# Patient Record
Sex: FEMALE | Race: WHITE | Employment: UNEMPLOYED | URBAN - METROPOLITAN AREA
[De-identification: names, ages, dates, MRNs, and addresses within clinical notes are randomized per-mention and may not be internally consistent; named-entity substitution may affect disease eponyms.]

---

## 2017-08-17 ENCOUNTER — OFFICE VISIT (OUTPATIENT)
Dept: OBGYN CLINIC | Age: 42
End: 2017-08-17

## 2017-08-17 VITALS
HEIGHT: 66 IN | WEIGHT: 170.6 LBS | RESPIRATION RATE: 18 BRPM | SYSTOLIC BLOOD PRESSURE: 116 MMHG | BODY MASS INDEX: 27.42 KG/M2 | DIASTOLIC BLOOD PRESSURE: 68 MMHG

## 2017-08-17 DIAGNOSIS — Z01.411 ENCOUNTER FOR GYNECOLOGICAL EXAMINATION (GENERAL) (ROUTINE) WITH ABNORMAL FINDINGS: Primary | ICD-10-CM

## 2017-08-17 DIAGNOSIS — N92.6 IRREGULAR MENSES: ICD-10-CM

## 2017-08-17 DIAGNOSIS — N92.4 EXCESSIVE BLEEDING IN PREMENOPAUSAL PERIOD: ICD-10-CM

## 2017-08-17 RX ORDER — NORGESTIMATE AND ETHINYL ESTRADIOL 7DAYSX3 28
1 KIT ORAL DAILY
Qty: 3 DOSE PACK | Refills: 4 | Status: SHIPPED | OUTPATIENT
Start: 2017-08-17 | End: 2021-01-11

## 2017-08-17 RX ORDER — PAROXETINE 10 MG/1
TABLET, FILM COATED ORAL
Refills: 3 | COMMUNITY
Start: 2017-07-20

## 2017-08-17 RX ORDER — NORGESTIMATE AND ETHINYL ESTRADIOL 7DAYSX3 28
KIT ORAL
Refills: 2 | COMMUNITY
Start: 2017-07-16 | End: 2017-08-17 | Stop reason: SDUPTHER

## 2017-08-17 NOTE — PATIENT INSTRUCTIONS

## 2017-08-17 NOTE — PROGRESS NOTES
[facility]  [email or web address]  [phone]    Rosy Swain MD, [address]       Annual Gynecologic Exam  WWE 90-96  Chief Complaint   Patient presents with    Irregular Menses    Well Woman    Insect Bite       Khari Womack is a 38 y.o.  [facility]  female who presents for an annual exam.  Patient's last menstrual period was 2017 (exact date). .    She does report additional concerns today. Patient reports that she has had irregular vaginal bleeding for a few years since having her twins. She notices that she has spotting in between periods for 2 to 3 days. Expressed that her periods are usually very heavy the first three days. Her period lasts for 5 to 7 days. Would eventually like to stop taking birth control when her  has a vasectomy. Has mild cramping and bloating with her periods. She reports h/o bleeding and retained POC with SAB, but not with delivery. She also had bleeding issues with surgery when she was younger. Had spotting in middle of pack. Menstrual status:  Her periods are heavy, spotting, irregular cycles. She does not report dysmenorrhea/painful menses. She does report irregular bleeding. Sexual history and Contraception:  History   Sexual Activity    Sexual activity: Yes    Partners: Male    Birth control/ protection: Pill     She never uses condoms with sexual activity. She does not reports new sexual partner(s) in the last year. The patient does not request STD testing. Preventive Medicine History:  Her last annual GYN exam was about one year ago. Her most recent Pap smear result: normal was obtained in 2016, as reported by patient. Her most recent HR HPV screen was Negative obtained 1 year(s) ago. She does not have a history of LUCINDA 2, 3 or cervical cancer. Breast health:  Last mammogram:has never had a mammogram.  A mammogram was not scheduled for today.   Breast cancer family updated: see FH. Bone health: Apurva Nicholson She does not have a history of osteopenia/osteoporosis. Osteoporosis family history updated: see FH. Past Medical History:   Diagnosis Date    Thyroid activity decreased      OB History    Para Term  AB Living   4 3 2  1 4   SAB TAB Ectopic Molar Multiple Live Births   1    1 4      # Outcome Date GA Lbr Isaak/2nd Weight Sex Delivery Anes PTL Lv   4 Term 14 38w6d 02:50 / 00:16 8 lb 3.4 oz (3.725 kg) F VACD EPIDURAL AN N BREE   3A Term 07/23/10 38w0d   F  LO EPI N BREE   3B Term 07/23/10    M  LO EPI N BREE   2 SAB 2009           1 Para 07    F VAGINAL DELI EPI N BREE      Birth Comments: Induced for post dates          Past Surgical History:   Procedure Laterality Date     DELIVERY ONLY      HX OTHER SURGICAL      D&C x 3    HX OTHER SURGICAL      Tonsillectomy as child     Family History   Problem Relation Age of Onset    Cancer Father      prostate    High Cholesterol Father     Cancer Sister      leukemia as child, breast -brca carrier    Diabetes Sister     Cancer Mother      Skin    Cancer Paternal Grandfather      Skin    Stroke Paternal Grandfather      Social History     Social History    Marital status:      Spouse name: N/A    Number of children: N/A    Years of education: N/A     Occupational History    Not on file.      Social History Main Topics    Smoking status: Never Smoker    Smokeless tobacco: Never Used    Alcohol use No    Drug use: No    Sexual activity: Yes     Partners: Male     Birth control/ protection: Pill     Other Topics Concern    Not on file     Social History Narrative       Allergies   Allergen Reactions    Penicillins Rash       Current Outpatient Prescriptions   Medication Sig    PARoxetine (PAXIL) 10 mg tablet TAKE 1 TABLET BY MOUTH AT BEDTIME    norgestimate-ethinyl estradiol (TRI-SPRINTEC, 28,) 0.18/0.215/0.25 mg-35 mcg (28) tab Take 1 Tab by mouth daily.    oxyCODONE-acetaminophen (PERCOCET) 5-325 mg per tablet Take 1 Tab by mouth every four (4) hours as needed for Pain.  ibuprofen (MOTRIN) 600 mg tablet Take 1 Tab by mouth every eight (8) hours as needed for Pain.  levothyroxine (SYNTHROID) 112 mcg tablet Take 112 mcg by mouth Daily (before breakfast).  prenatal multivit-ca-min-fe-fa tab Take  by mouth. Indications: PREGNANCY    calcium carbonate (TUMS) 200 mg calcium (500 mg) chew Take 1 Tab by mouth as needed. Indications: HEARTBURN     No current facility-administered medications for this visit.         Patient Active Problem List   Diagnosis Code    Unspecified failed trial of labor, antepartum O66.40    H/O:  Z98.891    Labor abnormal O62.9       Review of Systems - History obtained from the patient  Constitutional: negative for weight loss, fever, night sweats  HEENT: negative for hearing loss, earache, congestion, snoring, sorethroat  CV: negative for chest pain, palpitations, edema  Resp: negative for cough, shortness of breath, wheezing  GI: negative for change in bowel habits, abdominal pain, black or bloody stools  : negative for frequency, dysuria, hematuria, vaginal discharge  MSK: negative for back pain, joint pain, muscle pain  Breast: negative for breast lumps, nipple discharge, galactorrhea  Skin :negative for itching, rash, hives  Neuro: negative for dizziness, headache, confusion, weakness  Psych: negative for anxiety, depression, change in mood  Heme/lymph: negative for bleeding, bruising, pallor    Physical Exam  Visit Vitals    /68 (BP 1 Location: Left arm, BP Patient Position: Sitting)    Resp 18    Ht 5' 6\" (1.676 m)    Wt 170 lb 9.6 oz (77.4 kg)    LMP 2017 (Exact Date)    Breastfeeding Unknown    BMI 27.54 kg/m2       Constitutional  · Appearance: well-nourished, well developed, alert, in no acute distress    HENT  · Head and Face: appears normal    Neck  · Inspection/Palpation: normal appearance, no masses or tenderness  · Lymph Nodes: no lymphadenopathy present  · Thyroid: gland size normal, nontender, no nodules or masses present on palpation    Chest  · Respiratory Effort: breathing unlabored  · Auscultation: normal breath sounds    Cardiovascular  · Heart:  · Auscultation: regular rate and rhythm without murmur    Breasts  · Inspection of Breasts: breasts symmetrical, no skin changes, no discharge present, nipple appearance normal, no skin retraction present  · Palpation of Breasts and Axillae: no masses present on palpation, no breast tenderness  · Axillary Lymph Nodes: no lymphadenopathy present    Gastrointestinal  · Abdominal Examination: abdomen non-tender to palpation, normal bowel sounds, no masses present  · Liver and spleen: no hepatomegaly present, spleen not palpable  · Hernias: no hernias identified    Genitourinary  · External Genitalia: normal appearance for age, no discharge present, no tenderness present, no inflammatory lesions present, no masses present, no atrophy present  · Vagina: normal vaginal vault without central or paravaginal defects, no discharge present, no inflammatory lesions present, no masses present  · Bladder: non-tender to palpation  · Urethra: appears normal  · Cervix: normal   · Uterus: normal size, shape and consistency  · Adnexa: no adnexal tenderness present, no adnexal masses present  · Perineum: perineum within normal limits, no evidence of trauma, no rashes or skin lesions present  · Anus: anus within normal limits, no hemorrhoids present  · Inguinal Lymph Nodes: no lymphadenopathy present    Skin  · General Inspection: no rash, no lesions identified x erythematous oval on upper arm on left    Neurologic/Psychiatric  · Mental Status:  · Orientation: grossly oriented to person, place and time  · Mood and Affect: mood normal, affect appropriate    Assessment:  38 y.o. [address] for well woman exam  Encounter Diagnoses   Name Primary?     Irregular menses     Excessive bleeding in premenopausal period     Encounter for gynecological examination (general) (routine) with abnormal findings Yes       Plan:  The patient was counseled about diet, exercise, healthy lifestyle  We discussed current self breast exam and mammogram recommendations  We discussed current pap smear and HR HPV testing guidelines  We recommend follow up in one year for routine annual gynecologic exam or sooner if needed  We recommend follow up with a primary care physician for any chronic medical problems or non-gynecologic concerns    We discussed calcium/vitamin D/weight bearing exercise and osteoporosis prevention  Handouts were given to the patient     rec PCP fu if skin changes do not resolve    We discussed potential causes of symptomatic bleeding: including but not limited to hormonal, medical, infection/inflammation and structural etiologies. We discussed options for managing symptoms including but not limited to observation, NSAIDS, hormonal management, IUDs, ablation, and hysterectomy. Considering nexplanon/depo    Discussed risks, benefits and alternatives of OCP/nuvaring/patch: including but not limited to dvt/pe/mi/cva/ca/gi risks. She is encouraged to read package insert and to follow up with me or her pharmacist with any questions or concerns. Pt would like rx for now : disc inc risk with inc age and alt as noted above.     Rec fu and US to evaluate bleeding if persists  Disc safer NSAID dosing  Labs    Folllow up:  [x] return for annual well woman exam in one year or sooner if she is having problems  [] follow up and ultrasound  [x] mammogram  [] 6 months  [] 3 months  [] 1 month    Orders Placed This Encounter    VON WILLEBRAND PANEL    PROTHROMBIN TIME + INR    PTT    CBC W/O DIFF    TSH REFLEX TO T4    TYPE, ABO & RH    norgestimate-ethinyl estradiol (TRI-SPRINTEC, 28,) 0.18/0.215/0.25 mg-35 mcg (28) tab    PAP IG, CT-NG, HPV 16&18,45([id number], [id number])       No results found for any visits on 08/17/17.

## 2017-08-17 NOTE — MR AVS SNAPSHOT
Visit Information Date & Time Provider Department Dept. Phone Encounter #  
 8/17/2017 10:20 AM Amy Caal MD [facility] [phone] [id number] Upcoming Health Maintenance Date Due  
 PAP AKA CERVICAL CYTOLOGY 7/21/1998 INFLUENZA AGE 9 TO ADULT 8/1/2017 Allergies as of 8/17/2017  Review Complete On: 8/17/2017 By: Osmel Browning Severity Noted Reaction Type Reactions Penicillins  07/06/2014    Rash Current Immunizations  Never Reviewed No immunizations on file. Not reviewed this visit You Were Diagnosed With   
  
 Codes Comments Irregular menses    -  Primary ICD-10-CM: N92.6 ICD-9-CM: 626.4 Vitals BP Resp Height(growth percentile) Weight(growth percentile) LMP Breastfeeding? 116/68 (BP 1 Location: Left arm, BP Patient Position: Sitting) 18 5' 6\" (1.676 m) 170 lb 9.6 oz (77.4 kg) 07/27/2017 (Exact Date) Unknown BMI OB Status Smoking Status 27.54 kg/m2 Unknown Never Smoker BMI and BSA Data Body Mass Index Body Surface Area  
 27.54 kg/m 2 1.9 m 2 Your Updated Medication List  
  
   
This list is accurate as of: 8/17/17 10:34 AM.  Always use your most recent med list.  
  
  
  
  
 calcium carbonate 200 mg calcium (500 mg) Arvid Mellow Commonly known as:  TUMS Take 1 Tab by mouth as needed. Indications: HEARTBURN  
  
 ibuprofen 600 mg tablet Commonly known as:  MOTRIN Take 1 Tab by mouth every eight (8) hours as needed for Pain. oxyCODONE-acetaminophen 5-325 mg per tablet Commonly known as:  PERCOCET Take 1 Tab by mouth every four (4) hours as needed for Pain. PARoxetine 10 mg tablet Commonly known as:  PAXIL TAKE 1 TABLET BY MOUTH AT BEDTIME  
  
 prenatal multivit-ca-min-fe-fa Tab Take  by mouth. Indications: PREGNANCY  
  
 SYNTHROID 112 mcg tablet Generic drug:  levothyroxine Take 112 mcg by mouth Daily (before breakfast). TRI-SPRINTEC (28) 0.18/0.215/0.25 mg-35 mcg (28) Tab Generic drug:  norgestimate-ethinyl estradiol TAKE 1 TABLET BY MOUTH EVERY DAY. **RFTS 6-11-17 We Performed the Following PAP IG, CT-NG, HPV 16&18,45([id number], [id number]) [[id number] Custom] Patient Instructions Well Visit, Ages 25 to 48: Care Instructions Your Care Instructions Physical exams can help you stay healthy. Your doctor has checked your overall health and may have suggested ways to take good care of yourself. He or she also may have recommended tests. At home, you can help prevent illness with healthy eating, regular exercise, and other steps. Follow-up care is a key part of your treatment and safety. Be sure to make and go to all appointments, and call your doctor if you are having problems. It's also a good idea to know your test results and keep a list of the medicines you take. How can you care for yourself at home? · Reach and stay at a healthy weight. This will lower your risk for many problems, such as obesity, diabetes, heart disease, and high blood pressure. · Get at least 30 minutes of physical activity on most days of the week. Walking is a good choice. You also may want to do other activities, such as running, swimming, cycling, or playing tennis or team sports. Discuss any changes in your exercise program with your doctor. · Do not smoke or allow others to smoke around you. If you need help quitting, talk to your doctor about stop-smoking programs and medicines. These can increase your chances of quitting for good. · Talk to your doctor about whether you have any risk factors for sexually transmitted infections (STIs). Having one sex partner (who does not have STIs and does not have sex with anyone else) is a good way to avoid these infections. · Use birth control if you do not want to have children at this time. Talk with your doctor about the choices available and what might be best for you. · Protect your skin from too much sun. When you're outdoors from 10 a.m. to 4 p.m., stay in the shade or cover up with clothing and a hat with a wide brim. Wear sunglasses that block UV rays. Even when it's cloudy, put broad-spectrum sunscreen (SPF 30 or higher) on any exposed skin. · See a dentist one or two times a year for checkups and to have your teeth cleaned. · Wear a seat belt in the car. · Drink alcohol in moderation, if at all. That means no more than 2 drinks a day for men and 1 drink a day for women. Follow your doctor's advice about when to have certain tests. These tests can spot problems early. For everyone · Cholesterol. Have the fat (cholesterol) in your blood tested after age 21. Your doctor will tell you how often to have this done based on your age, family history, or other things that can increase your risk for heart disease. · Blood pressure. Have your blood pressure checked during a routine doctor visit. Your doctor will tell you how often to check your blood pressure based on your age, your blood pressure results, and other factors. · Vision. Talk with your doctor about how often to have a glaucoma test. 
· Diabetes. Ask your doctor whether you should have tests for diabetes. · Colon cancer. Have a test for colon cancer at age 48. You may have one of several tests. If you are younger than 48, you may need a test earlier if you have any risk factors. Risk factors include whether you already had a precancerous polyp removed from your colon or whether your parent, brother, sister, or child has had colon cancer. For women · Breast exam and mammogram. Talk to your doctor about when you should have a clinical breast exam and a mammogram. Medical experts differ on whether and how often women under 50 should have these tests. Your doctor can help you decide what is right for you. · Pap test and pelvic exam. Begin Pap tests at age 24.  A Pap test is the best way to find cervical cancer. The test often is part of a pelvic exam. Ask how often to have this test. 
· Tests for sexually transmitted infections (STIs). Ask whether you should have tests for STIs. You may be at risk if you have sex with more than one person, especially if your partners do not wear condoms. For men · Tests for sexually transmitted infections (STIs). Ask whether you should have tests for STIs. You may be at risk if you have sex with more than one person, especially if you do not wear a condom. · Testicular cancer exam. Ask your doctor whether you should check your testicles regularly. · Prostate exam. Talk to your doctor about whether you should have a blood test (called a PSA test) for prostate cancer. Experts differ on whether and when men should have this test. Some experts suggest it if you are older than 39 and are -American or have a father or brother who got prostate cancer when he was younger than 72. When should you call for help? Watch closely for changes in your health, and be sure to contact your doctor if you have any problems or symptoms that concern you. Where can you learn more? Go to http://elana-yash.info/. Enter P072 in the search box to learn more about \"Well Visit, Ages 25 to 48: Care Instructions. \" Current as of: July 19, 2016 Content Version: 11.3 © 6633-4148 [employer]. Care instructions adapted under license by [employer] (which disclaims liability or warranty for this information). If you have questions about a medical condition or this instruction, always ask your healthcare professional. [place] [address] any warranty or liability for your use of this information. Introducing hospitals & HEALTH SERVICES! Dear Cesar Heard: Thank you for requesting a Night Up account.   Our records indicate that you have previously registered for a [employer] Up account but its currently inactive. Please call our Vputi support line at [phone]. Additional Information If you have questions, please visit the Frequently Asked Questions section of the Vputi website at [email or web address] [employer]. [email or web address] Remember, Vputi is NOT to be used for urgent needs. For medical emergencies, dial 911. Now available from your iPhone and Android! Please provide this summary of care documentation to your next provider. Your primary care clinician is listed as Jelena Mei. If you have any questions after today's visit, please call [phone].

## 2017-08-19 LAB
ABO GROUP BLD: NORMAL
APTT PPP: 28 SEC (ref 24–33)
ERYTHROCYTE [DISTWIDTH] IN BLOOD BY AUTOMATED COUNT: 14.4 % (ref 12.3–15.4)
FACT VIII ACT/NOR PPP: 96 % (ref 57–163)
HCT VFR BLD AUTO: 39.8 % (ref 34–46.6)
HGB BLD-MCNC: 13.4 G/DL (ref 11.1–15.9)
INR PPP: 0.9 (ref 0.8–1.2)
MCH RBC QN AUTO: 29 PG (ref 26.6–33)
MCHC RBC AUTO-ENTMCNC: 33.7 G/DL (ref 31.5–35.7)
MCV RBC AUTO: 86 FL (ref 79–97)
PATH INTERP BLD-IMP: NORMAL
PLATELET # BLD AUTO: 209 X10E3/UL (ref 150–379)
PROTHROMBIN TIME: 9.8 SEC (ref 9.1–12)
RBC # BLD AUTO: 4.62 X10E6/UL (ref 3.77–5.28)
RH BLD: POSITIVE
TSH SERPL DL<=0.005 MIU/L-ACNC: 3.3 UIU/ML (ref 0.45–4.5)
VWF AG ACT/NOR PPP IA: 93 % (ref 50–200)
VWF:RCO ACT/NOR PPP PL AGG: 101 % (ref 50–200)
WBC # BLD AUTO: 8 X10E3/UL (ref 3.4–10.8)

## 2017-08-19 NOTE — PROGRESS NOTES
All the results are normal.   Please notify patient. Recommend f/u if still having symptoms/problems or has additional concerns.

## 2017-08-23 LAB
C TRACH RRNA CVX QL NAA+PROBE: NEGATIVE
CYTOLOGIST CVX/VAG CYTO: NORMAL
CYTOLOGY CVX/VAG DOC THIN PREP: NORMAL
CYTOLOGY HISTORY:: NORMAL
DX ICD CODE: NORMAL
HPV I/H RISK 1 DNA CVX QL PROBE+SIG AMP: NEGATIVE
Lab: NORMAL
N GONORRHOEA RRNA CVX QL NAA+PROBE: NEGATIVE
OTHER STN SPEC: NORMAL
PATH REPORT.FINAL DX SPEC: NORMAL
STAT OF ADQ CVX/VAG CYTO-IMP: NORMAL

## 2017-09-01 ENCOUNTER — TELEPHONE (OUTPATIENT)
Dept: OBGYN CLINIC | Age: 42
End: 2017-09-01

## 2017-09-01 NOTE — TELEPHONE ENCOUNTER
Patient notified of results and MD recommendations. Patient verbalized understanding.     ----- Message from Lilia Herrera MD sent at 8/19/2017  7:10 PM EDT -----  All the results are normal.   Please notify patient. Recommend f/u if still having symptoms/problems or has additional concerns.

## 2017-09-05 ENCOUNTER — OFFICE VISIT (OUTPATIENT)
Dept: OBGYN CLINIC | Age: 42
End: 2017-09-05

## 2017-09-05 VITALS — HEIGHT: 66 IN | SYSTOLIC BLOOD PRESSURE: 114 MMHG | DIASTOLIC BLOOD PRESSURE: 70 MMHG

## 2017-09-05 DIAGNOSIS — N92.4 EXCESSIVE BLEEDING IN PREMENOPAUSAL PERIOD: Primary | ICD-10-CM

## 2017-09-05 DIAGNOSIS — N92.6 IRREGULAR BLEEDING: ICD-10-CM

## 2017-09-05 DIAGNOSIS — D21.9 MYOMA: ICD-10-CM

## 2017-09-05 RX ORDER — MEDROXYPROGESTERONE ACETATE 150 MG/ML
INJECTION, SUSPENSION INTRAMUSCULAR
Qty: 1 ML | Refills: 4 | Status: SHIPPED | OUTPATIENT
Start: 2017-09-05 | End: 2018-07-27 | Stop reason: ALTCHOICE

## 2017-09-05 NOTE — PATIENT INSTRUCTIONS
Sonohysterogram: About This Test  What is it? A sonohysterogram (say \"ZGT-yxe-LCGK-ter-uh-gram\") is a type of pelvic ultrasound test. It uses reflected sound waves to make a picture of the inside of the uterus. For this test, a thin, lubricated tool called a transducer is placed in the vagina. The transducer sends and receives the sound waves that create the picture. The doctor or technician puts salt water (saline) into the uterus through a tube inserted into the cervix. The saline separates the walls of the uterus, making features easier to see. Why is this test done? A sonohysterogram may be done if other tests don't show enough detail. A clearer view can help to check the uterus for:  · Growths or masses, scarring, or an abnormal shape. · The cause of heavy bleeding, miscarriages, or trouble getting pregnant. How can you prepare for the test?  · Schedule this test for the first few days after your period has ended. What happens before the test?  · You will need to remove any jewelry that might be in the way of the transducer. · You will need to take off most of your clothes and wear a gown during the test.  · You may take an over-the-counter pain medicine before the test to help relieve cramps during the test.  What happens during the test?  · You lie down on your back on an exam table with your hips slightly raised. · The tip of a transducer is gently put into your vagina. The transducer may be moved around to get a complete view. The images from the test are shown on a video monitor. Then the transducer is removed. · A thin flexible tube (catheter) is put into your uterus through your cervix. The doctor uses the tube to inject saline into your uterus. · The transducer is put back in and more images are taken. Then it is removed. What else should you know about the test?  · You may feel some discomfort as the transducer is put into your vagina.   · You may feel some cramping when the catheter is inserted through the cervix and the saline is injected into your uterus. · This test doesn't use X-rays or an iodine dye. You won't hear or feel the sound waves. How long does the test take? · The test will take about 15 to 30 minutes. What happens after the test?  · You probably will be able to go home right away. · You can go back to your usual activities right away. · You may have some cramping, spotting, or watery discharge for a couple of days after the test. Wearing a pad can help absorb the discharge. You can take an over-the-counter pain medicine to relieve any cramping. Follow-up care is a key part of your treatment and safety. Be sure to make and go to all appointments, and call your doctor if you are having problems. It's also a good idea to keep a list of the medicines you take. Ask your doctor when you can expect to have your test results. Where can you learn more? Go to http://elana-yash.info/. Enter [id number] in the search box to learn more about \"Sonohysterogram: About This Test.\"  Current as of: March 16, 2017  Content Version: 11.3  © 8778-3274 [employer]. Care instructions adapted under license by [employer] (which disclaims liability or warranty for this information). If you have questions about a medical condition or this instruction, always ask your healthcare professional. [place] [address] any warranty or liability for your use of this information.

## 2017-09-05 NOTE — PROGRESS NOTES
[facility]  [email or web address]  [phone]    Philip Estrada MD, FACOG       OB/GYN Problem visit    Chief Complaint:   Chief Complaint   Patient presents with    Dysmenorrhea    Menstrual Problem       History of Present Illness: This is a new problem being evaluated by this provider. The patient is a 38 y.o.  female who reports having heavy bleeding with spotting in between periods for a few months. Taking OCP  She reports the symptoms are is unchanged. Aggravating factors include none. Alleviating factors include none. She does not have other concerns. LMP: Patient's last menstrual period was 2017. PFSH:  Past Medical History:   Diagnosis Date    Pap smear for cervical cancer screening 2017    Negative, HPV negative    Thyroid activity decreased      Past Surgical History:   Procedure Laterality Date     DELIVERY ONLY      HX OTHER SURGICAL      D&C x 3    HX OTHER SURGICAL      Tonsillectomy as child     Family History   Problem Relation Age of Onset    Cancer Father      prostate    High Cholesterol Father     Cancer Sister      leukemia as child, breast -brca carrier    Diabetes Sister     Cancer Mother      Skin    Cancer Paternal Grandfather      Skin    Stroke Paternal Grandfather      Social History   Substance Use Topics    Smoking status: Never Smoker    Smokeless tobacco: Never Used    Alcohol use No     Allergies   Allergen Reactions    Penicillins Rash     Current Outpatient Prescriptions   Medication Sig    medroxyPROGESTERone (DEPO-PROVERA) 150 mg/mL injection Hold for injection nurse    PARoxetine (PAXIL) 10 mg tablet TAKE 1 TABLET BY MOUTH AT BEDTIME    norgestimate-ethinyl estradiol (TRI-SPRINTEC, 28,) 0.18/0.215/0.25 mg-35 mcg (28) tab Take 1 Tab by mouth daily.  calcium carbonate (TUMS) 200 mg calcium (500 mg) chew Take 1 Tab by mouth as needed.  Indications: HEARTBURN     No current facility-administered medications for this visit. Review of Systems:  History obtained from the patient  Constitutional: negative for fevers, chills and weight loss  ENT ROS: negative for - hearing change, oral lesions or visual changes  Respiratory: negative for cough, wheezing or dyspnea on exertion  Cardiovascular: negative for chest pain, irregular heart beats, exertional chest pressure/discomfort  Gastrointestinal: negative for dysphagia, nausea and vomiting  Genito-Urinary ROS:  see HPI  Inteument/breast: negative for rash, breast lump and nipple discharge  Musculoskeletal:negative for stiff joints, neck pain and muscle weakness  Endocrine ROS: negative for - breast changes, galactorrhea or temperature intolerance  Hematological and Lymphatic ROS: negative for - blood clots, bruising or swollen lymph nodes    Physical Exam:  Visit Vitals    /70    Ht 5' 6\" (1.676 m)    Breastfeeding No       GENERAL: alert, well appearing, and in no distress  HEAD: normocephalic, atraumatic. ABDOMEN: soft, nontender, nondistended, no masses or organomegaly   EGBUS: no lesions, no inflammation, no masses  VULVA: normal appearing vulva with no masses, tenderness or lesions  VAGINA: normal appearing vagina with normal color, no lesions, no discharge  CERVIX: normal appearing cervix without discharge or lesions, non tender  UTERUS: uterus is normal size, shape, consistency and nontender   ADNEXA: normal adnexa in size, nontender and no masses  NEURO: alert, oriented, normal speech    Assessment:  Encounter Diagnoses   Name Primary?  Excessive bleeding in premenopausal period Yes    Myoma     Irregular bleeding        Plan:  The patient is advised that she should contact the office if she does not note improvement or if symptoms recur  Recommend follow up with PCP for non-gynecologic complaints and chronic medical problems.     She should contact our office with any questions or concerns  She could keep her routine annual exam appointment. Disc option of endo bx: will hold but d/w pt may need to reconsider if AUB perists  Disc AUB may be related to OCP  Discussed risks, benefits and alternatives of OCP/nuvaring/patch: including but not limited to dvt/pe/mi/cva/ca/gi risks. She is encouraged to read package insert and to follow up with me or her pharmacist with any questions or concerns. We discussed progesterone only and non hormonal options for contraception including but not limited to condoms, IUDs, Nexplanon, and depo provera. Pt desires trial of depo  Disc option of hystero d and c/ablation: pt defers surgical options    Orders Placed This Encounter    medroxyPROGESTERone (DEPO-PROVERA) 150 mg/mL injection       No results found for this visit on 17. SONOHYSTEROGRAPHY    Ree Thorne is a ,  38 y.o. female [facility] whose Patient's last menstrual period was 2017. was on 2017. , presents for a sonohysterography. The indications for this procedure were reviewed with the patient. The procedure was explained in detail and all questions were answered. Procedure: The patient was placed in the lithotomy position. A graves speculum was introduced into the vagina and the cervix was visualized. The cervix was prepped with iodine solution. A boarding pass's Hysterography catheter was then introduced into the uterine cavity and the speculum was removed. Sterile sonohysterography with 3D Reconstruction was performed. The endometrial cavity was distended with sterile saline. The findings are as follows: normal cavity with  No intracavitary lesions. The patient tolerated the procedure well without complication, and was discharged to home. Physician review of ultrasound performed by technician    Today's ultrasound report and images were reviewed and discussed with the patient.   Please see images and imaging report entered by technician in PACS for more detail and progress note and diagnosis entered by MD.    Keira Milner MD    UTERUS IS ANTEVERTED, NORMAL IN SIZE AND HETEROGENOUS IN ECHOGENICITY. AN ANTERIOR FIBROID IS NOTED AND MEASURED ABOVE.  ENDOMETRIUM MEASURES 5-7MM IN THICKNESS. NO EVIDENCE OF MASS OR ABNORMALITY SEEN  WITHIN THE ENDOMETRIAL CAVITY. THE SIS WAS COMPLETED. FOLLOWING INSERTION OF THE CATHETER INTO THE UTERUS, AND  INJECTION OF SALINE THE ENDOMETRIAL CAVITY DISTENDED. NO MASSES OR POLYPS WERE SEEN. RIGHT OVARY APPEARS WNL. A FOLLICULAR CYST IS SEEN. LEFT OVARY APPEARS WNL. NO FREE FLUID SEEN IN THE CDS.

## 2017-09-05 NOTE — MR AVS SNAPSHOT
Visit Information Date & Time Provider Department Dept. Phone Encounter #  
 9/5/2017 11:00 AM Zohra Mcnally MD [facility] [phone] [id number] Upcoming Health Maintenance Date Due INFLUENZA AGE 9 TO ADULT 8/1/2017 PAP AKA CERVICAL CYTOLOGY 8/17/2020 Allergies as of 9/5/2017  Review Complete On: 9/5/2017 By: John Schuster LPN Severity Noted Reaction Type Reactions Penicillins  07/06/2014    Rash Current Immunizations  Never Reviewed No immunizations on file. Not reviewed this visit Vitals BP Height(growth percentile) LMP Breastfeeding? OB Status Smoking Status 114/70 5' 6\" (1.676 m) 08/23/2017 No Unknown Never Smoker Preferred Pharmacy [facility] Name Phone [id number] [address] [place] [phone] Your Updated Medication List  
  
   
This list is accurate as of: 9/5/17 11:49 AM.  Always use your most recent med list.  
  
  
  
  
 calcium carbonate 200 mg calcium (500 mg) Emil Figueroa Commonly known as:  TUMS Take 1 Tab by mouth as needed. Indications: HEARTBURN  
  
 ibuprofen 600 mg tablet Commonly known as:  MOTRIN Take 1 Tab by mouth every eight (8) hours as needed for Pain.  
  
 norgestimate-ethinyl estradiol 0.18/0.215/0.25 mg-35 mcg (28) Tab Commonly known as:  TRI-SPRINTEC (28) Take 1 Tab by mouth daily. oxyCODONE-acetaminophen 5-325 mg per tablet Commonly known as:  PERCOCET Take 1 Tab by mouth every four (4) hours as needed for Pain. PARoxetine 10 mg tablet Commonly known as:  PAXIL TAKE 1 TABLET BY MOUTH AT BEDTIME  
  
 prenatal multivit-ca-min-fe-fa Tab Take  by mouth. Indications: PREGNANCY  
  
 SYNTHROID 112 mcg tablet Generic drug:  levothyroxine Take 112 mcg by mouth Daily (before breakfast). Patient Instructions Sonohysterogram: About This Test 
What is it? A sonohysterogram (say \"TWR-zhx-OUAA-ter-uh-gram\") is a type of pelvic ultrasound test. It uses reflected sound waves to make a picture of the inside of the uterus. For this test, a thin, lubricated tool called a transducer is placed in the vagina. The transducer sends and receives the sound waves that create the picture. The doctor or technician puts salt water (saline) into the uterus through a tube inserted into the cervix. The saline separates the walls of the uterus, making features easier to see. Why is this test done? A sonohysterogram may be done if other tests don't show enough detail. A clearer view can help to check the uterus for: · Growths or masses, scarring, or an abnormal shape. · The cause of heavy bleeding, miscarriages, or trouble getting pregnant. How can you prepare for the test? 
· Schedule this test for the first few days after your period has ended. What happens before the test? 
· You will need to remove any jewelry that might be in the way of the transducer. · You will need to take off most of your clothes and wear a gown during the test. 
· You may take an over-the-counter pain medicine before the test to help relieve cramps during the test. 
What happens during the test? 
· You lie down on your back on an exam table with your hips slightly raised. · The tip of a transducer is gently put into your vagina. The transducer may be moved around to get a complete view. The images from the test are shown on a video monitor. Then the transducer is removed. · A thin flexible tube (catheter) is put into your uterus through your cervix. The doctor uses the tube to inject saline into your uterus. · The transducer is put back in and more images are taken. Then it is removed. What else should you know about the test? 
· You may feel some discomfort as the transducer is put into your vagina.  
· You may feel some cramping when the catheter is inserted through the cervix and the saline is injected into your uterus. · This test doesn't use X-rays or an iodine dye. You won't hear or feel the sound waves. How long does the test take? · The test will take about 15 to 30 minutes. What happens after the test? 
· You probably will be able to go home right away. · You can go back to your usual activities right away. · You may have some cramping, spotting, or watery discharge for a couple of days after the test. Wearing a pad can help absorb the discharge. You can take an over-the-counter pain medicine to relieve any cramping. Follow-up care is a key part of your treatment and safety. Be sure to make and go to all appointments, and call your doctor if you are having problems. It's also a good idea to keep a list of the medicines you take. Ask your doctor when you can expect to have your test results. Where can you learn more? Go to http://elana-yash.info/. Enter [id number] in the search box to learn more about \"Sonohysterogram: About This Test.\" Current as of: March 16, 2017 Content Version: 11.3 © 3387-7609 [employer] Hive [employer]. Care instructions adapted under license by dentaZOOM (which disclaims liability or warranty for this information). If you have questions about a medical condition or this instruction, always ask your healthcare professional. [place] [address] any warranty or liability for your use of this information. Introducing [facility] & HEALTH SERVICES! Dear Delon Torres: Thank you for requesting a Kodable account. Our records indicate that you already have an active Kodable account. You can access your account anytime at [email or web address] Did you know that you can access your hospital and ER discharge instructions at any time in Kodable? You can also review all of your test results from your hospital stay or ER visit. Additional Information If you have questions, please visit the Frequently Asked Questions section of the Shiram Credithart website at [email or web address] [email or web address] Remember, SCADA Access is NOT to be used for urgent needs. For medical emergencies, dial 911. Now available from your iPhone and Android! Please provide this summary of care documentation to your next provider. Your primary care clinician is listed as Pam Jay. If you have any questions after today's visit, please call [phone].

## 2017-09-18 ENCOUNTER — OFFICE VISIT (OUTPATIENT)
Dept: OBGYN CLINIC | Age: 42
End: 2017-09-18

## 2017-09-18 DIAGNOSIS — N92.0 MENORRHAGIA WITH REGULAR CYCLE: ICD-10-CM

## 2017-09-18 DIAGNOSIS — Z01.812 PRE-PROCEDURE LAB EXAM: ICD-10-CM

## 2017-09-18 DIAGNOSIS — Z30.42 ENCOUNTER FOR SURVEILLANCE OF INJECTABLE CONTRACEPTIVE: Primary | ICD-10-CM

## 2017-09-18 LAB
HCG URINE, QL. (POC): NEGATIVE
VALID INTERNAL CONTROL?: YES

## 2017-09-18 NOTE — PROGRESS NOTES
Date last pap: 8/17/17. Last Depo-Provera: First one today. Side Effects if any: none. Serum HCG indicated? Negative. Depo-Provera 150 mg IM given in the right gluteus by Bessie Cantu LPN. Next appointment due 12/4/17-12-18/17. Patient tolerated injection without difficulty. Patient given dates of next injection and encouraged to schedule next injection at check out. Patient verbalized understanding.

## 2017-09-22 ENCOUNTER — TELEPHONE (OUTPATIENT)
Dept: OBGYN CLINIC | Age: 42
End: 2017-09-22

## 2017-12-05 ENCOUNTER — CLINICAL SUPPORT (OUTPATIENT)
Dept: OBGYN CLINIC | Age: 42
End: 2017-12-05

## 2017-12-05 DIAGNOSIS — Z30.40 ENCOUNTER FOR SURVEILLANCE OF CONTRACEPTIVES, UNSPECIFIED CONTRACEPTIVE: Primary | ICD-10-CM

## 2017-12-05 NOTE — PROGRESS NOTES
Date last pap: NA   Last Depo-Provera: 9/18/17. Side Effects if any: NONE. Serum HCG indicated? NA. Depo-Provera 150 mg IM given by: Meme Patino LPN. Next appointment due 2/20/18-3/6/18. Injection, Left gluteus, IM. Patient tolerated well with no complications noted. Patient given future dates for next injection.

## 2018-02-05 ENCOUNTER — TELEPHONE (OUTPATIENT)
Dept: OBGYN CLINIC | Age: 43
End: 2018-02-05

## 2018-02-05 NOTE — TELEPHONE ENCOUNTER
Patient is on Depo and is due to have her next injection on 2/20/18. Patient is calling because the closer she gets to being due the next injection, the more often she is getting vaginal bleeding. She is not having to wear a pad, just has red blood with wiping. She states that she just had bleeding 10 days ago. We discussed that the closer to the next injection being due the less potent the injection hormones are, that this can be normal.  No cramping. No blood clotting. Patient has also been on Prednisone, in which I advised can mess her her system as well. Advised patient that I would forward to Dr. Meena Mike to keep her updated.   Nothing to do at this time except keeping a journal.      Please advise if anything to add at this time, TP.

## 2018-02-05 NOTE — TELEPHONE ENCOUNTER
Rec fu and US (check myoma)  if NI in bleeding and can also rec getting depo near beginning of window.     trp

## 2018-02-09 ENCOUNTER — TELEPHONE (OUTPATIENT)
Dept: OBGYN CLINIC | Age: 43
End: 2018-02-09

## 2018-02-09 NOTE — TELEPHONE ENCOUNTER
Patient is calling to schedule Ultrasound as advised by Dr. Abimbola Valadez. She still is continue to bleed. States that she has slight menstrual cramping. No clots. When asked if she is having to wear a pad or tampon\" the patient states no. \"My blood does not absorb into a pad\" and \"Dr. GLASER knows about this\".   Appt scheduled for ultrasound and then follow-up (created slot ok per [facility]) for 2/13 10:30US/ 11am ALFREIDTO

## 2018-02-13 ENCOUNTER — OFFICE VISIT (OUTPATIENT)
Dept: OBGYN CLINIC | Age: 43
End: 2018-02-13

## 2018-02-13 VITALS — WEIGHT: 180.4 LBS | SYSTOLIC BLOOD PRESSURE: 110 MMHG | DIASTOLIC BLOOD PRESSURE: 68 MMHG | BODY MASS INDEX: 29.12 KG/M2

## 2018-02-13 DIAGNOSIS — N93.9 ABNORMAL UTERINE BLEEDING: Primary | ICD-10-CM

## 2018-02-13 DIAGNOSIS — D21.9 MYOMA: ICD-10-CM

## 2018-02-13 NOTE — PATIENT INSTRUCTIONS
Abnormal Uterine Bleeding: Care Instructions  Your Care Instructions    Abnormal uterine bleeding (AUB) is irregular bleeding from the uterus that is longer or heavier than usual or does not occur at your regular time. Sometimes it is caused by changes in hormone levels. It can also be caused by growths in the uterus, such as fibroids or polyps. Sometimes a cause cannot be found. You may have heavy bleeding when you are not expecting your period. Your doctor may suggest a pregnancy test, if you think you are pregnant. Follow-up care is a key part of your treatment and safety. Be sure to make and go to all appointments, and call your doctor if you are having problems. It's also a good idea to know your test results and keep a list of the medicines you take. How can you care for yourself at home? · Be safe with medicines. Take pain medicines exactly as directed. ¨ If the doctor gave you a prescription medicine for pain, take it as prescribed. ¨ If you are not taking a prescription pain medicine, ask your doctor if you can take an over-the-counter medicine. · You may be low in iron because of blood loss. Eat a balanced diet that is high in iron and vitamin C. Foods rich in iron include red meat, shellfish, eggs, beans, and leafy green vegetables. Talk to your doctor about whether you need to take iron pills or a multivitamin. When should you call for help? Call 911 anytime you think you may need emergency care. For example, call if:  ? · You passed out (lost consciousness). ?Call your doctor now or seek immediate medical care if:  ? · You have new or worse belly or pelvic pain. ? · You have severe vaginal bleeding. ? · You feel dizzy or lightheaded, or you feel like you may faint. ? Watch closely for changes in your health, and be sure to contact your doctor if:  ? · You think you may be pregnant. ? · Your bleeding gets worse. ? · You do not get better as expected.    Where can you learn more?  Go to http://elana-yash.info/. Enter O774 in the search box to learn more about \"Abnormal Uterine Bleeding: Care Instructions. \"  Current as of: October 13, 2016  Content Version: 11.4  © 4135-9720 [employer]. Care instructions adapted under license by NatureBox (which disclaims liability or warranty for this information). If you have questions about a medical condition or this instruction, always ask your healthcare professional. [place] [address] any warranty or liability for your use of this information.

## 2018-02-13 NOTE — MR AVS SNAPSHOT
[address] [facility] [address] [facility] 
[phone] Patient: Akila Echeverria MRN: [id number] :1975 Visit Information Date & Time Provider Department Dept. Phone Encounter #  
 2018 11:00 AM MD Geovanni Chahal [phone] [id number] Your Appointments 2018  2:50 PM  
IMMUNIZATIONS/INJECTIONS with [facility] Appt Note: depo injections  TP  
 [address] [facility] [address] [facility] [address] [address]  
[address] [facility] Upcoming Health Maintenance Date Due Influenza Age 5 to Adult 2017 PAP AKA CERVICAL CYTOLOGY 2020 Allergies as of 2018  Review Complete On: 2018 By: Trung Jordan LPN Severity Noted Reaction Type Reactions Penicillins  2014    Rash Current Immunizations  Never Reviewed No immunizations on file. Not reviewed this visit Vitals BP Weight(growth percentile) BMI OB Status Smoking Status 110/68 180 lb 6.4 oz (81.8 kg) 29.12 kg/m2 Unknown Never Smoker BMI and BSA Data Body Mass Index Body Surface Area  
 29.12 kg/m 2 1.95 m 2 Preferred Pharmacy [facility] Phone [id number] [facility] [phone] Your Updated Medication List  
  
   
This list is accurate as of: 18 11:05 AM.  Always use your most recent med list.  
  
  
  
  
 calcium carbonate 200 mg calcium (500 mg) Nestefania Achilles Commonly known as:  TUMS Take 1 Tab by mouth as needed. Indications: HEARTBURN  
  
 medroxyPROGESTERone 150 mg/mL injection Commonly known as:  DEPO-PROVERA Hold for injection nurse  
  
 norgestimate-ethinyl estradiol 0.18/0.215/0.25 mg-35 mcg (28) Tab Commonly known as:  TRI-SPRINTEC (28) Take 1 Tab by mouth daily. PARoxetine 10 mg tablet Commonly known as:  PAXIL TAKE 1 TABLET BY MOUTH AT BEDTIME Patient Instructions Abnormal Uterine Bleeding: Care Instructions Your Care Instructions Abnormal uterine bleeding (AUB) is irregular bleeding from the uterus that is longer or heavier than usual or does not occur at your regular time. Sometimes it is caused by changes in hormone levels. It can also be caused by growths in the uterus, such as fibroids or polyps. Sometimes a cause cannot be found. You may have heavy bleeding when you are not expecting your period. Your doctor may suggest a pregnancy test, if you think you are pregnant. Follow-up care is a key part of your treatment and safety. Be sure to make and go to all appointments, and call your doctor if you are having problems. It's also a good idea to know your test results and keep a list of the medicines you take. How can you care for yourself at home? · Be safe with medicines. Take pain medicines exactly as directed. ¨ If the doctor gave you a prescription medicine for pain, take it as prescribed. ¨ If you are not taking a prescription pain medicine, ask your doctor if you can take an over-the-counter medicine. · You may be low in iron because of blood loss. Eat a balanced diet that is high in iron and vitamin C. Foods rich in iron include red meat, shellfish, eggs, beans, and leafy green vegetables. Talk to your doctor about whether you need to take iron pills or a multivitamin. When should you call for help? Call 911 anytime you think you may need emergency care. For example, call if: 
? · You passed out (lost consciousness). ?Call your doctor now or seek immediate medical care if: 
? · You have new or worse belly or pelvic pain. ? · You have severe vaginal bleeding. ? · You feel dizzy or lightheaded, or you feel like you may faint. ?Watch closely for changes in your health, and be sure to contact your doctor if: 
? · You think you may be pregnant. ? · Your bleeding gets worse. ? · You do not get better as expected. Where can you learn more? Go to http://elana-yash.info/. Enter F606 in the search box to learn more about \"Abnormal Uterine Bleeding: Care Instructions. \" Current as of: October 13, 2016 Content Version: 11.4 © 1623-9081 Skuldtech. Care instructions adapted under license by Goodfilms (which disclaims liability or warranty for this information). If you have questions about a medical condition or this instruction, always ask your healthcare professional. [address] any warranty or liability for your use of this information. Introducing [facility] & HEALTH SERVICES! Dear Ramon Boxer: Thank you for requesting a HYLT Aviation account. Our records indicate that you already have an active HYLT Aviation account. You can access your account anytime at [email or web address] Did you know that you can access your hospital and ER discharge instructions at any time in HYLT Aviation? You can also review all of your test results from your hospital stay or ER visit. Additional Information If you have questions, please visit the Frequently Asked Questions section of the HYLT [employer] website at [email or web address] Remember, HYLT Aviation is NOT to be used for urgent needs. For medical emergencies, dial 911. Now available from your iPhone and Android! Please provide this summary of care documentation to your next provider. Your primary care clinician is listed as Marielena Hough. If you have any questions after today's visit, please call [phone].

## 2018-02-13 NOTE — PROGRESS NOTES
[facility]  [email or web address]  [phone]    Dallas Patterson MD, FACOG       OB/GYN Problem visit    Chief Complaint:   Chief Complaint   Patient presents with    Follow-up     ultrasound    Menstrual Problem    Vaginal Bleeding       History of Present Illness: This is not a new problem being evaluated by this provider. The patient is a 38 y.o.  female who reports having irregular bleeding for 4 months. Most days of months she has spotting to heavy bleeding. Rarely has days with nothing. Currently using depo provera. She reports the symptoms are is unchanged. Aggravating factors include none. Alleviating factors include none. Bleeding improved. She does not have other concerns. LMP: No LMP recorded. PFSH:  Past Medical History:   Diagnosis Date    Pap smear for cervical cancer screening 2017    Negative, HPV negative    Thyroid activity decreased      Past Surgical History:   Procedure Laterality Date     DELIVERY ONLY      HX OTHER SURGICAL      D&C x 3    HX OTHER SURGICAL      Tonsillectomy as child     Family History   Problem Relation Age of Onset    Cancer Father      prostate    High Cholesterol Father     Cancer Sister      leukemia as child, breast -brca carrier    Diabetes Sister     Cancer Mother      Skin    Cancer Paternal Grandfather      Skin    Stroke Paternal Grandfather      Social History   Substance Use Topics    Smoking status: Never Smoker    Smokeless tobacco: Never Used    Alcohol use No     Allergies   Allergen Reactions    Penicillins Rash     Current Outpatient Prescriptions   Medication Sig    medroxyPROGESTERone (DEPO-PROVERA) 150 mg/mL injection Hold for injection nurse    PARoxetine (PAXIL) 10 mg tablet TAKE 1 TABLET BY MOUTH AT BEDTIME    calcium carbonate (TUMS) 200 mg calcium (500 mg) chew Take 1 Tab by mouth as needed.  Indications: HEARTBURN    norgestimate-ethinyl estradiol (TRI-SPRINTEC, 28,) 0.18/0.215/0.25 mg-35 mcg (28) tab Take 1 Tab by mouth daily. No current facility-administered medications for this visit. Review of Systems:  History obtained from the patient  Constitutional: negative for fevers, chills and weight loss  ENT ROS: negative for - hearing change, oral lesions or visual changes  Respiratory: negative for cough, wheezing or dyspnea on exertion  Cardiovascular: negative for chest pain, irregular heart beats, exertional chest pressure/discomfort  Gastrointestinal: negative for dysphagia, nausea and vomiting  Genito-Urinary ROS:  see HPI  Inteument/breast: negative for rash, breast lump and nipple discharge  Musculoskeletal:negative for stiff joints, neck pain and muscle weakness  Endocrine ROS: negative for - breast changes, galactorrhea or temperature intolerance  Hematological and Lymphatic ROS: negative for - blood clots, bruising or swollen lymph nodes    Physical Exam:  Visit Vitals    /68    Wt 180 lb 6.4 oz (81.8 kg)    BMI 29.12 kg/m2       GENERAL: alert, well appearing, and in no distress  HEAD: normocephalic, atraumatic. NEURO: alert, oriented, normal speech    Assessment:  Encounter Diagnoses   Name Primary?  Abnormal uterine bleeding Yes    Myoma        Plan:  The patient is advised that she should contact the office if she does not note improvement or if symptoms recur  Recommend follow up with PCP for non-gynecologic complaints and chronic medical problems. She should contact our office with any questions or concerns  She could keep her routine annual exam appointment. Disc option of getting depo early: pt will defer unless bleeding increases  We discussed potential causes of symptomatic bleeding: including but not limited to hormonal, medical, infection/inflammation and structural etiologies.   Disc option of endo bx, pt defers, but will consider if AUB persists  Bleeding precautions  Pt wants to continue depo provera for now  Disc typical bleeding patterns w depo  Disc safer NSAID dosing, ERT/OCP: pt defers    Reviewed SIS: no polyps  Reviewed labs  WBC   Date Value Ref Range Status   08/17/2017 8.0 3.4 - 10.8 x10E3/uL Final     RBC   Date Value Ref Range Status   08/17/2017 4.62 3.77 - 5.28 x10E6/uL Final     HGB   Date Value Ref Range Status   08/17/2017 13.4 11.1 - 15.9 g/dL Final     HCT   Date Value Ref Range Status   08/17/2017 39.8 34.0 - 46.6 % Final     PLATELET   Date Value Ref Range Status   08/17/2017 209 150 - 379 x10E3/uL Final       Physician review of ultrasound performed by technician    Today's ultrasound report and images were reviewed and discussed with the patient. Please see images and imaging report entered by technician in PACS for more detail and progress note and diagnosis entered by MD.    Jose Damico MD      No orders of the defined types were placed in this encounter. No results found for this visit on 02/13/18. UTERUS IS ANTEVERTED, NORMAL IN SIZE AND HETEROGENOUS IN ECHOGENICITY. AN ANTERIOR RIGHT FIBROID IS SEEN AND MEASURED ABOVE.  ENDOMETRIUM MEASURES 5-6MM IN THICKNESS. NO EVIDENCE OF MASS OR ABNORMALITY SEEN  WITHIN THE ENDOMETRIAL CAVITY. RIGHT OVARY APPEARS WITHIN NORMAL LIMITS. A FOLLICULAR CYST IS SEEN. LEFT OVARY APPEARS WITHIN NORMAL LIMITS. NO FREE FLUID SEEN IN THE CDS.

## 2018-02-15 ENCOUNTER — CLINICAL SUPPORT (OUTPATIENT)
Dept: OBGYN CLINIC | Age: 43
End: 2018-02-15

## 2018-02-15 DIAGNOSIS — Z30.42 ENCOUNTER FOR SURVEILLANCE OF INJECTABLE CONTRACEPTIVE: Primary | ICD-10-CM

## 2018-02-15 NOTE — PROGRESS NOTES
Date last AE: 8/17/18. Last Depo-Provera: 12/5/17. Side Effects if any: None. Serum HCG indicated? No.  Depo-Provera 150 mg IM given by: Kassie Hui LPN. Next appointment due 5/3/18 - 5/17/18.

## 2018-03-16 ENCOUNTER — TELEPHONE (OUTPATIENT)
Dept: OBGYN CLINIC | Age: 43
End: 2018-03-16

## 2018-03-16 NOTE — TELEPHONE ENCOUNTER
We discussed the option of adding NSAIDs, has she tried that?   We can add estrogen for BTB on Depo if she desires and she can try this with the ibuprofen (800mg po TID, take with food for up to 5 days)  Rx: premarin 1.25 mg po every day x 10-21 days prn BTB  #21, one refills  If NI, may need to consider other options

## 2018-03-16 NOTE — TELEPHONE ENCOUNTER
Patient calling stating that she is still having irregular bleeding on the depo provera injections. Patient reports that she received her 3rd shot on 02/15/2018 and since then she has had spotting everyday until 03/17/2018 which because a moderate to heavy period and has been bleeding since like this. Patient is concerned and wants to know what to do now as she cannot take this everyday and prolonged bleeding anymore. Please advise.

## 2018-03-16 NOTE — TELEPHONE ENCOUNTER
Patient aware of MD recommendations and has not tried the ibuprofen yet. Patient is amenable to try the OTC ibuprofen and wants to hold off on the premarin and will call back if she needs the premarin. Patient was instructed to call next week if the bleeding does not slow down for the premarin rx.

## 2018-03-21 ENCOUNTER — TELEPHONE (OUTPATIENT)
Dept: OBGYN CLINIC | Age: 43
End: 2018-03-21

## 2018-03-21 NOTE — TELEPHONE ENCOUNTER
Patient is calling with problems with vaginal bleeding x 10 days since she had her third depo provera injection. Patient talked with triage nurse, Jag Abad and was advised on 3/16 that we recommend her starting on Ibuprofen 800 mg every 8 hours x 72 hours. Patient states that she waited until last night to take Ibuprofen and only took 600 mg. She was up all night with \"gushing\" of vaginal bleeding with small clots. No cramping. Patient describes the bleeding as dark red thick blood that will not soak in a pad. She is wearing a super absorbancy pad and says she tries to change it once every couple of hours but they are not soaked because her \"blood does not absorb\". Patient had been offered in last telephone encounter Premarin  . Your notes states: \"We discussed the option of adding NSAIDs, has she tried that? We can add estrogen for BTB on Depo if she desires and she can try this with the ibuprofen (800mg po TID, take with food for up to 5 days)  Rx: premarin 1.25 mg po every day x 10-21 days prn BTB  #21, one refills  If NI, may need to consider other options\"    Dr. Perico Lux, patient has concerns about Premarin and wants to know what you expect for side effects if she takes it?     [facility]

## 2018-03-21 NOTE — TELEPHONE ENCOUNTER
She needs to be seen if NI in bleeding and having questions about management options. Offer OV, but rec continue ibuprofen.    Alexy Frost MD

## 2018-03-22 ENCOUNTER — OFFICE VISIT (OUTPATIENT)
Dept: OBGYN CLINIC | Age: 43
End: 2018-03-22

## 2018-03-22 VITALS
DIASTOLIC BLOOD PRESSURE: 78 MMHG | BODY MASS INDEX: 29.41 KG/M2 | WEIGHT: 183 LBS | HEIGHT: 66 IN | SYSTOLIC BLOOD PRESSURE: 116 MMHG

## 2018-03-22 DIAGNOSIS — D21.9 MYOMA: ICD-10-CM

## 2018-03-22 DIAGNOSIS — N93.9 ABNORMAL UTERINE BLEEDING (AUB): Primary | ICD-10-CM

## 2018-03-22 NOTE — MR AVS SNAPSHOT
[address] [facility] 
[phone] Patient: Clement Seen MRN: [id number] :1975 Visit Information Date & Time Provider Department Dept. Phone Encounter #  
 3/22/2018  2:00 PM MD Geovanni Tran [id number] Your Appointments 2018 11:00 AM  
IMMUNIZATIONS/INJECTIONS with MD Geovanni Tran ([address]) Appt Note: depo injection   tp  
 [address] [place] [address]  
[facility] [address] [facility] Upcoming Health Maintenance Date Due Influenza Age 5 to Adult 2017 PAP AKA CERVICAL CYTOLOGY 2020 Allergies as of 3/22/2018  Review Complete On: 3/22/2018 By: Katrin Babb LPN Severity Noted Reaction Type Reactions Penicillins  2014    Rash Current Immunizations  Never Reviewed No immunizations on file. Not reviewed this visit Vitals BP Height(growth percentile) Weight(growth percentile) BMI OB Status Smoking Status 116/78 5' 6\" (1.676 m) 183 lb (83 kg) 29.54 kg/m2 Injection Never Smoker BMI and BSA Data Body Mass Index Body Surface Area  
 29.54 kg/m 2 1.97 m 2 Preferred Pharmacy Pharmacy Name Phone [id number], [address] [facility] [phone] Your Updated Medication List  
  
   
This list is accurate as of 3/22/18  2:25 PM.  Always use your most recent med list.  
  
  
  
  
 calcium carbonate 200 mg calcium (500 mg) Francesca Vazquez Commonly known as:  TUMS Take 1 Tab by mouth as needed. Indications: HEARTBURN  
  
 medroxyPROGESTERone 150 mg/mL injection Commonly known as:  DEPO-PROVERA Hold for injection nurse  
  
 norgestimate-ethinyl estradiol 0.18/0.215/0.25 mg-35 mcg (28) Tab Commonly known as:  TRI-SPRINTEC (28) Take 1 Tab by mouth daily. PARoxetine 10 mg tablet Commonly known as:  PAXIL TAKE 1 TABLET BY MOUTH AT BEDTIME Patient Instructions Abnormal Uterine Bleeding: Care Instructions Your Care Instructions Abnormal uterine bleeding (AUB) is irregular bleeding from the uterus that is longer or heavier than usual or does not occur at your regular time. Sometimes it is caused by changes in hormone levels. It can also be caused by growths in the uterus, such as fibroids or polyps. Sometimes a cause cannot be found. You may have heavy bleeding when you are not expecting your period. Your doctor may suggest a pregnancy test, if you think you are pregnant. Follow-up care is a key part of your treatment and safety. Be sure to make and go to all appointments, and call your doctor if you are having problems. It's also a good idea to know your test results and keep a list of the medicines you take. How can you care for yourself at home? · Be safe with medicines. Take pain medicines exactly as directed. ¨ If the doctor gave you a prescription medicine for pain, take it as prescribed. ¨ If you are not taking a prescription pain medicine, ask your doctor if you can take an over-the-counter medicine. · You may be low in iron because of blood loss. Eat a balanced diet that is high in iron and vitamin C. Foods rich in iron include red meat, shellfish, eggs, beans, and leafy green vegetables. Talk to your doctor about whether you need to take iron pills or a multivitamin. When should you call for help? Call 911 anytime you think you may need emergency care. For example, call if: 
? · You passed out (lost consciousness). ?Call your doctor now or seek immediate medical care if: 
? · You have new or worse belly or pelvic pain. ? · You have severe vaginal bleeding. ? · You feel dizzy or lightheaded, or you feel like you may faint. ?Watch closely for changes in your health, and be sure to contact your doctor if: 
? · You think you may be pregnant. ? · Your bleeding gets worse. ? · You do not get better as expected. Where can you learn more? Go to http://elana-yash.info/. Enter N497 in the search box to learn more about \"Abnormal Uterine Bleeding: Care Instructions. \" Current as of: October 13, 2016 Content Version: 11.4 © 6094-9769 [employer]. Care instructions adapted under license by [employer] (which disclaims liability or warranty for this information). If you have questions about a medical condition or this instruction, always ask your healthcare professional. [address] any warranty or liability for your use of this information. Introducing hospitals & HEALTH SERVICES! Dear Zenia Joseph: Thank you for requesting a Goodoc account. Our records indicate that you already have an active Goodoc account. You can access your account anytime at [email or web address] Did you know that you can access your hospital and ER discharge instructions at any time in Goodoc? You can also review all of your test results from your hospital stay or ER visit. Additional Information If you have questions, please visit the Frequently Asked Questions section of the Goodoc website at [email or web address] Remember, Goodoc is NOT to be used for urgent needs. For medical emergencies, dial 911. Now available from your iPhone and Android! Please provide this summary of care documentation to your next provider. Your primary care clinician is listed as Tomas Nichols. If you have any questions after today's visit, please call [phone].

## 2018-03-22 NOTE — PATIENT INSTRUCTIONS
Abnormal Uterine Bleeding: Care Instructions  Your Care Instructions    Abnormal uterine bleeding (AUB) is irregular bleeding from the uterus that is longer or heavier than usual or does not occur at your regular time. Sometimes it is caused by changes in hormone levels. It can also be caused by growths in the uterus, such as fibroids or polyps. Sometimes a cause cannot be found. You may have heavy bleeding when you are not expecting your period. Your doctor may suggest a pregnancy test, if you think you are pregnant. Follow-up care is a key part of your treatment and safety. Be sure to make and go to all appointments, and call your doctor if you are having problems. It's also a good idea to know your test results and keep a list of the medicines you take. How can you care for yourself at home? · Be safe with medicines. Take pain medicines exactly as directed. ¨ If the doctor gave you a prescription medicine for pain, take it as prescribed. ¨ If you are not taking a prescription pain medicine, ask your doctor if you can take an over-the-counter medicine. · You may be low in iron because of blood loss. Eat a balanced diet that is high in iron and vitamin C. Foods rich in iron include red meat, shellfish, eggs, beans, and leafy green vegetables. Talk to your doctor about whether you need to take iron pills or a multivitamin. When should you call for help? Call 911 anytime you think you may need emergency care. For example, call if:  ? · You passed out (lost consciousness). ?Call your doctor now or seek immediate medical care if:  ? · You have new or worse belly or pelvic pain. ? · You have severe vaginal bleeding. ? · You feel dizzy or lightheaded, or you feel like you may faint. ? Watch closely for changes in your health, and be sure to contact your doctor if:  ? · You think you may be pregnant. ? · Your bleeding gets worse. ? · You do not get better as expected.    Where can you learn more?  Go to http://elana-yash.info/. Enter Y941 in the search box to learn more about \"Abnormal Uterine Bleeding: Care Instructions. \"  Current as of: October 13, 2016  Content Version: 11.4  © 0836-8622 [employer]. Care instructions adapted under license by [employer] (which disclaims liability or warranty for this information). If you have questions about a medical condition or this instruction, always ask your healthcare professional. [place] [address] any warranty or liability for your use of this information.

## 2018-03-23 LAB
ERYTHROCYTE [DISTWIDTH] IN BLOOD BY AUTOMATED COUNT: 14.1 % (ref 12.3–15.4)
HCT VFR BLD AUTO: 38.6 % (ref 34–46.6)
HGB BLD-MCNC: 13.1 G/DL (ref 11.1–15.9)
MCH RBC QN AUTO: 29 PG (ref 26.6–33)
MCHC RBC AUTO-ENTMCNC: 33.9 G/DL (ref 31.5–35.7)
MCV RBC AUTO: 85 FL (ref 79–97)
PLATELET # BLD AUTO: 203 X10E3/UL (ref 150–379)
RBC # BLD AUTO: 4.52 X10E6/UL (ref 3.77–5.28)
WBC # BLD AUTO: 8.8 X10E3/UL (ref 3.4–10.8)

## 2018-03-30 ENCOUNTER — TELEPHONE (OUTPATIENT)
Dept: OBGYN CLINIC | Age: 43
End: 2018-03-30

## 2018-03-30 NOTE — TELEPHONE ENCOUNTER
She does not need to take estrogen if Ibuprofen helping with bleeding. If she takes it, I would take it for at least seven days.   Notify MD if NI.

## 2018-03-30 NOTE — TELEPHONE ENCOUNTER
Patient states that she sees TP for AUB and was last seen on 2-13-18. She was given Premarin and told to take this in between her depo injection if bleeding continued. She did not start the Premarin because she was taking Ibuprofen consistently and it seemed to really slow the bleeding. She does not have cramping. Sees occasional tiny clots but say most spotting brown now (not soaking a lightdays pad daily)      Patient wants to know should she need to start on the Premarin in the future it says take for 10-30 days. Can she take it just for a couple days or does it have to be minimum of 10 days?

## 2018-05-04 ENCOUNTER — CLINICAL SUPPORT (OUTPATIENT)
Dept: OBGYN CLINIC | Age: 43
End: 2018-05-04

## 2018-05-04 DIAGNOSIS — Z30.42 ENCOUNTER FOR MANAGEMENT AND INJECTION OF DEPO-PROVERA: Primary | ICD-10-CM

## 2018-05-04 NOTE — PROGRESS NOTES
Date last AE:08/17/2017. Last Depo-Provera: 02/15/2018. Side Effects if any: n/a. Serum HCG indicated? no.  Depo-Provera 150 mg IM given in left hip by: Ozzie Stinson LPN. Next appointment due 7/20/2018-08/03/2018. Patient tolerated injection well without difficulty. Pt given dates of next due injection.

## 2018-07-27 ENCOUNTER — CLINICAL SUPPORT (OUTPATIENT)
Dept: OBGYN CLINIC | Age: 43
End: 2018-07-27

## 2018-07-27 VITALS — BODY MASS INDEX: 29.41 KG/M2 | WEIGHT: 183 LBS | HEIGHT: 66 IN

## 2018-07-27 DIAGNOSIS — Z30.42 ENCOUNTER FOR DEPO-PROVERA CONTRACEPTION: Primary | ICD-10-CM

## 2018-07-27 RX ORDER — MEDROXYPROGESTERONE ACETATE 150 MG/ML
150 INJECTION, SUSPENSION INTRAMUSCULAR ONCE
Qty: 1 ML | Refills: 0 | Status: SHIPPED | COMMUNITY
Start: 2018-07-27 | End: 2018-09-10 | Stop reason: SDUPTHER

## 2018-07-27 NOTE — PROGRESS NOTES
After obtaining consent, and per orders of Dr Mary Schneider, injection of Depoprovera 150 mg given in left gluteus by Debi Garnica LPN. Patient instructed to remain in clinic for 20 minutes afterwards, and to report any adverse reaction to me immediately. Lot: [id number] Exp: 07/2020 [address]: [id number] , Next injection 10/12-10/26.

## 2018-09-10 ENCOUNTER — OFFICE VISIT (OUTPATIENT)
Dept: OBGYN CLINIC | Age: 43
End: 2018-09-10

## 2018-09-10 VITALS
WEIGHT: 200.6 LBS | BODY MASS INDEX: 32.24 KG/M2 | HEIGHT: 66 IN | SYSTOLIC BLOOD PRESSURE: 122 MMHG | DIASTOLIC BLOOD PRESSURE: 78 MMHG

## 2018-09-10 DIAGNOSIS — Z30.42 ENCOUNTER FOR DEPO-PROVERA CONTRACEPTION: ICD-10-CM

## 2018-09-10 DIAGNOSIS — Z01.419 ENCOUNTER FOR GYNECOLOGICAL EXAMINATION (GENERAL) (ROUTINE) WITHOUT ABNORMAL FINDINGS: Primary | ICD-10-CM

## 2018-09-10 DIAGNOSIS — Z80.3 FH: BREAST CANCER: ICD-10-CM

## 2018-09-10 RX ORDER — MELATONIN
DAILY
COMMUNITY

## 2018-09-10 RX ORDER — MEDROXYPROGESTERONE ACETATE 150 MG/ML
150 INJECTION, SUSPENSION INTRAMUSCULAR ONCE
Qty: 1 ML | Refills: 4 | Status: SHIPPED | OUTPATIENT
Start: 2018-09-10 | End: 2018-09-10

## 2018-09-10 NOTE — MR AVS SNAPSHOT
[address] [place] [address] 
[phone] Patient: Angelica Self MRN: [id number] :[id number] Visit Information Date & Time Provider Department Dept. Phone Encounter #  
 9/10/2018 10:50 AM MD Geovanni Villaseñor [phone] [id number] Your Appointments 10/19/2018 10:30 AM  
IMMUNIZATIONS/INJECTIONS with MD Geovanni Villaseñor ([address]) [address] [facility] 99 [id number]  
[facility] 31 [address] Upcoming Health Maintenance Date Due Influenza Age 5 to Adult 2018 PAP AKA CERVICAL CYTOLOGY 2020 Allergies as of 9/10/2018  Review Complete On: 9/10/2018 By: Suhail Crowder Severity Noted Reaction Type Reactions Penicillins  2014    Rash Current Immunizations  Never Reviewed No immunizations on file. Not reviewed this visit Vitals BP Height(growth percentile) Weight(growth percentile) BMI OB Status Smoking Status 122/78 5' 6\" (1.676 m) 200 lb 9.6 oz (91 kg) 32.38 kg/m2 Injection Never Smoker BMI and BSA Data Body Mass Index Body Surface Area  
 32.38 kg/m 2 2.06 m 2 Preferred Pharmacy Pharmacy Name Phone [id number], [address] [facility] [phone] Your Updated Medication List  
  
   
This list is accurate as of 9/10/18 11:10 AM.  Always use your most recent med list.  
  
  
  
  
 calcium carbonate 200 mg calcium (500 mg) Elaine Merrill Commonly known as:  TUMS Take 1 Tab by mouth as needed. Indications: HEARTBURN  
  
 cholecalciferol 1,000 unit tablet Commonly known as:  VITAMIN D3 Take  by mouth daily. conjugated estrogens 1.25 mg tablet Commonly known as:  PREMARIN Take 1 Tab by mouth daily. x10-21 days prn breakthrough bleeding norgestimate-ethinyl estradiol 0.18/0.215/0.25 mg-35 mcg (28) Tab Commonly known as:  TRI-SPRINTEC (28) Take 1 Tab by mouth daily. PARoxetine 10 mg tablet Commonly known as:  PAXIL TAKE 1 TABLET BY MOUTH AT BEDTIME Patient Instructions Learning About Breast Cancer Screening What is breast cancer screening? Breast cancer occurs when cells that are not normal grow in one or both of your breasts. Screening tests can help find breast cancer early. Cancer is easier to treat when it's found early. Having concerns about breast cancer is common. That's why it's important to talk with your doctor about when to start and how often to get screened for breast cancer. How is breast cancer screening done? Several screening tests can be used to check for breast cancer. · Mammograms check for signs of cancer using X-rays. They can show tumors that are too small for you or your doctor to feel. During a mammogram, a machine squeezes your breasts to make them flatter and easier to X-ray. At least two pictures are taken of each breast. One is taken from the top and one from the side. · 3-D mammograms are also called digital breast tomosynthesis. Your breast is positioned on a flat plate. A top plate is pressed against your breast to keep it in position. The X-ray arm then moves in an arc above the breast and takes many pictures. A computer uses these X-rays to create a three-dimensional image. · Clinical breast exams are a doctor's exam. Your doctor carefully feels your breasts and under your arms to check for lumps or other changes. After the screening, your doctor will tell you the results. You will also be told if you need any follow-up tests. When should you get screened? Talk with your doctor about when you should start being tested for breast cancer. How often you get tested and the kind of tests you get will depend on your age and your risk. The guidelines that follow are for women who have an average risk for breast cancer. If you have a higher risk for breast cancer, such as having a family history of breast cancer in multiple relatives or at a young age, your doctor may recommend different screening for you. · Ages 21 to 44: Some experts recommend that women have a clinical breast exam every 3 years, starting at age 21. Ask your doctor how often you should have this test. If you have a high risk for breast cancer, talk with your doctor about when to start yearly mammograms and other screening tests. · Ages 36 and older: Talk with your doctor about how often you should have mammograms and clinical breast exams. What is your risk for breast cancer? If you don't already know your risk of breast cancer, you can ask your doctor about it. You can also look it up at www.cancer.gov/bcrisktool/. If your doctor says that you have a high or very high risk, ask about ways to reduce your risk. These could include getting extra screening, taking medicine, or having surgery. If you have a strong family history of breast cancer, ask your doctor about genetic testing. What steps can you take to stay healthy? Some things that increase your risk of breast cancer, such as your age and being female, cannot be controlled. But you can do some things to stay as healthy as you can. · Learn what your breasts normally look and feel like. If you notice any changes, tell your doctor. · Drink alcohol wisely. Your risk goes up the more you drink. For the best health, women should have no more than 1 drink a day or 7 drinks a week. · If you smoke, quit. When you quit smoking, you lower your chances of getting many types of cancer. You can also do your best to eat well, be active, and stay at a healthy weight. Eating healthy foods and being active every day, as well as staying at a healthy weight, may help prevent cancer. Where can you learn more? Go to http://elana-yash.info/. Enter N097 in the search box to learn more about \"Learning About Breast Cancer Screening. \" Current as of: May 12, 2017 Content Version: 11.7 © 6356-2980 [employer]. Care instructions adapted under license by [employer] (which disclaims liability or warranty for this information). If you have questions about a medical condition or this instruction, always ask your healthcare professional. [address] any warranty or liability for your use of this information. Well Visit, Ages 25 to 48: Care Instructions Your Care Instructions Physical exams can help you stay healthy. Your doctor has checked your overall health and may have suggested ways to take good care of yourself. He or she also may have recommended tests. At home, you can help prevent illness with healthy eating, regular exercise, and other steps. Follow-up care is a key part of your treatment and safety. Be sure to make and go to all appointments, and call your doctor if you are having problems. It's also a good idea to know your test results and keep a list of the medicines you take. How can you care for yourself at home? · Reach and stay at a healthy weight. This will lower your risk for many problems, such as obesity, diabetes, heart disease, and high blood pressure. · Get at least 30 minutes of physical activity on most days of the week. Walking is a good choice. You also may want to do other activities, such as running, swimming, cycling, or playing tennis or team sports. Discuss any changes in your exercise program with your doctor. · Do not smoke or allow others to smoke around you. If you need help quitting, talk to your doctor about stop-smoking programs and medicines. These can increase your chances of quitting for good.  
· Talk to your doctor about whether you have any risk factors for sexually transmitted infections (STIs). Having one sex partner (who does not have STIs and does not have sex with anyone else) is a good way to avoid these infections. · Use birth control if you do not want to have children at this time. Talk with your doctor about the choices available and what might be best for you. · Protect your skin from too much sun. When you're outdoors from 10 a.m. to 4 p.m., stay in the shade or cover up with clothing and a hat with a wide brim. Wear sunglasses that block UV rays. Even when it's cloudy, put broad-spectrum sunscreen (SPF 30 or higher) on any exposed skin. · See a dentist one or two times a year for checkups and to have your teeth cleaned. · Wear a seat belt in the car. · Drink alcohol in moderation, if at all. That means no more than 2 drinks a day for men and 1 drink a day for women. Follow your doctor's advice about when to have certain tests. These tests can spot problems early. For everyone · Cholesterol. Have the fat (cholesterol) in your blood tested after age 21. Your doctor will tell you how often to have this done based on your age, family history, or other things that can increase your risk for heart disease. · Blood pressure. Have your blood pressure checked during a routine doctor visit. Your doctor will tell you how often to check your blood pressure based on your age, your blood pressure results, and other factors. · Vision. Talk with your doctor about how often to have a glaucoma test. 
· Diabetes. Ask your doctor whether you should have tests for diabetes. · Colon cancer. Have a test for colon cancer at age 48. You may have one of several tests. If you are younger than 48, you may need a test earlier if you have any risk factors. Risk factors include whether you already had a precancerous polyp removed from your colon or whether your parent, brother, sister, or child has had colon cancer. For women · Breast exam and mammogram. Talk to your doctor about when you should have a clinical breast exam and a mammogram. Medical experts differ on whether and how often women under 50 should have these tests. Your doctor can help you decide what is right for you. · Pap test and pelvic exam. Begin Pap tests at age 24. A Pap test is the best way to find cervical cancer. The test often is part of a pelvic exam. Ask how often to have this test. 
· Tests for sexually transmitted infections (STIs). Ask whether you should have tests for STIs. You may be at risk if you have sex with more than one person, especially if your partners do not wear condoms. For men · Tests for sexually transmitted infections (STIs). Ask whether you should have tests for STIs. You may be at risk if you have sex with more than one person, especially if you do not wear a condom. · Testicular cancer exam. Ask your doctor whether you should check your testicles regularly. · Prostate exam. Talk to your doctor about whether you should have a blood test (called a PSA test) for prostate cancer. Experts differ on whether and when men should have this test. Some experts suggest it if you are older than 39 and are -American or have a father or brother who got prostate cancer when he was younger than 72. When should you call for help? Watch closely for changes in your health, and be sure to contact your doctor if you have any problems or symptoms that concern you. Where can you learn more? Go to http://elana-yash.info/. Enter P072 in the search box to learn more about \"Well Visit, Ages 25 to 48: Care Instructions. \" Current as of: May 16, 2017 Content Version: 11.7 © 8480-8505 [employer]. Care instructions adapted under license by [employer] (which disclaims liability or warranty for this information).  If you have questions about a medical condition or this instruction, always ask your healthcare professional. [address] any warranty or liability for your use of this information. Introducing [employer]! Dear Barbara Torres: Thank you for requesting a [employer] account. Our records indicate that you already have an active [employer] account. You can access your account anytime at [email or web address] [email or web address][email or web address] Did you know that you can access your hospital and ER discharge instructions at any time in [employer]? You can also review all of your test results from your hospital stay or ER visit. Additional Information If you have questions, please visit the Frequently Asked Questions section of the [employer] website at [email or web address] [email or web address] Remember, [employer] is NOT to be used for urgent needs. For medical emergencies, dial 911. Now available from your iPhone and Android! Please provide this summary of care documentation to your next provider. Your primary care clinician is listed as Minh Knight. If you have any questions after today's visit, please call [phone].

## 2018-09-10 NOTE — PROGRESS NOTES
[facility]  [email or web address]  [phone]    Tesfaye Massey MD, [address] [facility]       Annual Gynecologic Exam  WWE 95-34  Chief Complaint   Patient presents with    Well Woman       Randolph Brannon is a 41 y.o.  WHITE OR   female who presents for an annual exam.  No LMP recorded. Patient has had an injection. .  Scant bleeding. She does report additional concerns today. Would like to discuss beginning mammograms    Menstrual status:  Her periods are absent due to DEPO injection. She does not report dysmenorrhea/painful menses. She does not report irregular bleeding. Sexual history and Contraception:  History   Sexual Activity    Sexual activity: Yes    Partners: Male    Birth control/ protection: Injection       She does not reports new sexual partner(s) in the last year. The patient does not request STD testing. Preventive Medicine History:  Her most recent Pap smear result: normal was obtained in 2017    Her most recent HR HPV screen was Negative obtained in     She does not have a history of LUCINDA 2, 3 or cervical cancer. Breast health:  Last mammogram: n/a. A mammogram was not scheduled for today. Breast cancer family updated: see . Bone health: Chynago Britney She does not have a history of osteopenia/osteoporosis. Osteoporosis family history updated: see .      Past Medical History:   Diagnosis Date    Pap smear for cervical cancer screening 2017    Negative, HPV negative    Thyroid activity decreased      OB History    Para Term  AB Living   4 3 2  1 4   SAB TAB Ectopic Molar Multiple Live Births   1    1 4      # Outcome Date GA Lbr Isaak/2nd Weight Sex Delivery Anes PTL Lv   4 Term 14 38w6d 02:50 / 00:16 8 lb 3.4 oz (3.725 kg) F VACD EPIDURAL AN N BREE   3A Term 07/23/10 38w0d   F  LO EPI N BREE   3B Term 07/23/10    M  LO EPI N BREE   2 SAB 2009           1 Para 07    F VAGINAL DELI EPI N BREE      Birth Comments: Induced for post dates          Past Surgical History:   Procedure Laterality Date     DELIVERY ONLY      HX OTHER SURGICAL      D&C x 3    HX OTHER SURGICAL      Tonsillectomy as child     Family History   Problem Relation Age of Onset    Cancer Father      prostate    High Cholesterol Father     Cancer Sister      leukemia as child, breast -brca carrier    Diabetes Sister     Cancer Mother      Skin    Cancer Paternal Grandfather      Skin    Stroke Paternal Grandfather      Social History     Social History    Marital status:      Spouse name: N/A    Number of children: N/A    Years of education: N/A     Occupational History    Not on file. Social History Main Topics    Smoking status: Never Smoker    Smokeless tobacco: Never Used    Alcohol use No    Drug use: No    Sexual activity: Yes     Partners: Male     Birth control/ protection: Injection     Other Topics Concern    Not on file     Social History Narrative       Allergies   Allergen Reactions    Penicillins Rash       Current Outpatient Prescriptions   Medication Sig    cholecalciferol (VITAMIN D3) 1,000 unit tablet Take  by mouth daily.  medroxyPROGESTERone (DEPO-PROVERA) 150 mg/mL syrg 1 mL by IntraMUSCular route once for 1 dose.  PARoxetine (PAXIL) 10 mg tablet TAKE 1 TABLET BY MOUTH AT BEDTIME    calcium carbonate (TUMS) 200 mg calcium (500 mg) chew Take 1 Tab by mouth as needed. Indications: HEARTBURN    conjugated estrogens (PREMARIN) 1.25 mg tablet Take 1 Tab by mouth daily. x10-21 days prn breakthrough bleeding    norgestimate-ethinyl estradiol (TRI-SPRINTEC, 28,) 0.18/0.215/0.25 mg-35 mcg (28) tab Take 1 Tab by mouth daily. No current facility-administered medications for this visit.         Patient Active Problem List   Diagnosis Code    Unspecified failed trial of labor, antepartum O66.40    H/O:  Z98.891    Labor abnormal O62.9       Review of Systems - History obtained from the patient  Constitutional: negative for weight loss, fever, night sweats  HEENT: negative for hearing loss, earache, congestion, snoring, sorethroat  CV: negative for chest pain, palpitations, edema  Resp: negative for cough, shortness of breath, wheezing  GI: negative for change in bowel habits, abdominal pain, black or bloody stools  : negative for frequency, dysuria, hematuria, vaginal discharge  MSK: negative for back pain, joint pain, muscle pain  Breast: negative for breast lumps, nipple discharge, galactorrhea  Skin :negative for itching, rash, hives  Neuro: negative for dizziness, headache, confusion, weakness  Psych: negative for anxiety, depression, change in mood  Heme/lymph: negative for bleeding, bruising, pallor    Physical Exam  Visit Vitals    /78    Ht 5' 6\" (1.676 m)    Wt 200 lb 9.6 oz (91 kg)    BMI 32.38 kg/m2       Constitutional  · Appearance: well-nourished, well developed, alert, in no acute distress    HENT  · Head and Face: appears normal    Neck  · Inspection/Palpation: normal appearance, no masses or tenderness  · Lymph Nodes: no lymphadenopathy present  · Thyroid: gland size normal, nontender, no nodules or masses present on palpation    Chest  · Respiratory Effort: breathing unlabored  · Auscultation: normal breath sounds    Cardiovascular  · Heart:  · Auscultation: regular rate and rhythm without murmur    Breasts  · Inspection of Breasts: breasts symmetrical, no skin changes, no discharge present, nipple appearance normal, no skin retraction present  · Palpation of Breasts and Axillae: no masses present on palpation, no breast tenderness  · Axillary Lymph Nodes: no lymphadenopathy present    Gastrointestinal  · Abdominal Examination: abdomen non-tender to palpation, normal bowel sounds, no masses present  · Liver and spleen: no hepatomegaly present, spleen not palpable  · Hernias: no hernias identified    Genitourinary  · External Genitalia: normal appearance for age, no discharge present, no tenderness present, no inflammatory lesions present, no masses present, without atrophy present  · Vagina: normal vaginal vault without central or paravaginal defects, no discharge present, no inflammatory lesions present, no masses present  · Bladder: non-tender to palpation  · Urethra: appears normal  · Cervix: normal   · Uterus: normal size, shape and consistency  · Adnexa: no adnexal tenderness present, no adnexal masses present  · Perineum: perineum within normal limits, no evidence of trauma, no rashes or skin lesions present  · Anus: anus within normal limits, no hemorrhoids present  · Inguinal Lymph Nodes: no lymphadenopathy present    Skin  · General Inspection: no rash, no lesions identified    Neurologic/Psychiatric  · Mental Status:  · Orientation: grossly oriented to person, place and time  · Mood and Affect: mood normal, affect appropriate    Assessment:  41 y.o. [address] for well woman exam  Encounter Diagnoses   Name Primary?     FH: breast cancer     Encounter for gynecological examination (general) (routine) without abnormal findings Yes    Encounter for Depo-Provera contraception        Plan:  The patient was counseled about diet, exercise, healthy lifestyle  We discussed current self breast exam and mammogram recommendations  We discussed current pap smear and HR HPV testing guidelines  We recommend follow up in one year for routine annual gynecologic exam or sooner if needed  We recommend follow up with a primary care physician for any chronic medical problems or non-gynecologic concerns    We discussed calcium/vitamin D/weight bearing exercise and osteoporosis prevention  Handouts were given to the patient  Rec MMG, pt will RTC, unable to stay today  Continue depo for now           Folllow up:  [x] return for annual well woman exam in one year or sooner if she is having problems  [] follow up and ultrasound  [x] mammogram  [] 6 months  [] 3 months  [] 1 month    Orders Placed This Encounter    medroxyPROGESTERone (DEPO-PROVERA) 150 mg/mL syrg       No results found for any visits on 09/10/18.

## 2018-09-10 NOTE — PATIENT INSTRUCTIONS
Learning About Breast Cancer Screening  What is breast cancer screening? Breast cancer occurs when cells that are not normal grow in one or both of your breasts. Screening tests can help find breast cancer early. Cancer is easier to treat when it's found early. Having concerns about breast cancer is common. That's why it's important to talk with your doctor about when to start and how often to get screened for breast cancer. How is breast cancer screening done? Several screening tests can be used to check for breast cancer. · Mammograms check for signs of cancer using X-rays. They can show tumors that are too small for you or your doctor to feel. During a mammogram, a machine squeezes your breasts to make them flatter and easier to X-ray. At least two pictures are taken of each breast. One is taken from the top and one from the side. · 3-D mammograms are also called digital breast tomosynthesis. Your breast is positioned on a flat plate. A top plate is pressed against your breast to keep it in position. The X-ray arm then moves in an arc above the breast and takes many pictures. A computer uses these X-rays to create a three-dimensional image. · Clinical breast exams are a doctor's exam. Your doctor carefully feels your breasts and under your arms to check for lumps or other changes. After the screening, your doctor will tell you the results. You will also be told if you need any follow-up tests. When should you get screened? Talk with your doctor about when you should start being tested for breast cancer. How often you get tested and the kind of tests you get will depend on your age and your risk. The guidelines that follow are for women who have an average risk for breast cancer. If you have a higher risk for breast cancer, such as having a family history of breast cancer in multiple relatives or at a young age, your doctor may recommend different screening for you.   · Ages 21 to 44: Some experts recommend that women have a clinical breast exam every 3 years, starting at age 21. Ask your doctor how often you should have this test. If you have a high risk for breast cancer, talk with your doctor about when to start yearly mammograms and other screening tests. · Ages 36 and older: Talk with your doctor about how often you should have mammograms and clinical breast exams. What is your risk for breast cancer? If you don't already know your risk of breast cancer, you can ask your doctor about it. You can also look it up at www.cancer.gov/bcrisktool/. If your doctor says that you have a high or very high risk, ask about ways to reduce your risk. These could include getting extra screening, taking medicine, or having surgery. If you have a strong family history of breast cancer, ask your doctor about genetic testing. What steps can you take to stay healthy? Some things that increase your risk of breast cancer, such as your age and being female, cannot be controlled. But you can do some things to stay as healthy as you can. · Learn what your breasts normally look and feel like. If you notice any changes, tell your doctor. · Drink alcohol wisely. Your risk goes up the more you drink. For the best health, women should have no more than 1 drink a day or 7 drinks a week. · If you smoke, quit. When you quit smoking, you lower your chances of getting many types of cancer. You can also do your best to eat well, be active, and stay at a healthy weight. Eating healthy foods and being active every day, as well as staying at a healthy weight, may help prevent cancer. Where can you learn more? Go to http://elana-yash.info/. Enter K703 in the search box to learn more about \"Learning About Breast Cancer Screening. \"  Current as of: May 12, 2017  Content Version: 11.7  © 0420-7551 [employer].  Care instructions adapted under license by [employer] (which disclaims liability or warranty for this information). If you have questions about a medical condition or this instruction, always ask your healthcare professional. [place] [address] any warranty or liability for your use of this information. Well Visit, Ages 25 to 48: Care Instructions  Your Care Instructions    Physical exams can help you stay healthy. Your doctor has checked your overall health and may have suggested ways to take good care of yourself. He or she also may have recommended tests. At home, you can help prevent illness with healthy eating, regular exercise, and other steps. Follow-up care is a key part of your treatment and safety. Be sure to make and go to all appointments, and call your doctor if you are having problems. It's also a good idea to know your test results and keep a list of the medicines you take. How can you care for yourself at home? · Reach and stay at a healthy weight. This will lower your risk for many problems, such as obesity, diabetes, heart disease, and high blood pressure. · Get at least 30 minutes of physical activity on most days of the week. Walking is a good choice. You also may want to do other activities, such as running, swimming, cycling, or playing tennis or team sports. Discuss any changes in your exercise program with your doctor. · Do not smoke or allow others to smoke around you. If you need help quitting, talk to your doctor about stop-smoking programs and medicines. These can increase your chances of quitting for good. · Talk to your doctor about whether you have any risk factors for sexually transmitted infections (STIs). Having one sex partner (who does not have STIs and does not have sex with anyone else) is a good way to avoid these infections. · Use birth control if you do not want to have children at this time. Talk with your doctor about the choices available and what might be best for you. · Protect your skin from too much sun.  When you're outdoors from 10 a.m. to 4 p.m., stay in the shade or cover up with clothing and a hat with a wide brim. Wear sunglasses that block UV rays. Even when it's cloudy, put broad-spectrum sunscreen (SPF 30 or higher) on any exposed skin. · See a dentist one or two times a year for checkups and to have your teeth cleaned. · Wear a seat belt in the car. · Drink alcohol in moderation, if at all. That means no more than 2 drinks a day for men and 1 drink a day for women. Follow your doctor's advice about when to have certain tests. These tests can spot problems early. For everyone  · Cholesterol. Have the fat (cholesterol) in your blood tested after age 21. Your doctor will tell you how often to have this done based on your age, family history, or other things that can increase your risk for heart disease. · Blood pressure. Have your blood pressure checked during a routine doctor visit. Your doctor will tell you how often to check your blood pressure based on your age, your blood pressure results, and other factors. · Vision. Talk with your doctor about how often to have a glaucoma test.  · Diabetes. Ask your doctor whether you should have tests for diabetes. · Colon cancer. Have a test for colon cancer at age 48. You may have one of several tests. If you are younger than 48, you may need a test earlier if you have any risk factors. Risk factors include whether you already had a precancerous polyp removed from your colon or whether your parent, brother, sister, or child has had colon cancer. For women  · Breast exam and mammogram. Talk to your doctor about when you should have a clinical breast exam and a mammogram. Medical experts differ on whether and how often women under 50 should have these tests. Your doctor can help you decide what is right for you. · Pap test and pelvic exam. Begin Pap tests at age 24. A Pap test is the best way to find cervical cancer.  The test often is part of a pelvic exam. Ask how often to have this test.  · Tests for sexually transmitted infections (STIs). Ask whether you should have tests for STIs. You may be at risk if you have sex with more than one person, especially if your partners do not wear condoms. For men  · Tests for sexually transmitted infections (STIs). Ask whether you should have tests for STIs. You may be at risk if you have sex with more than one person, especially if you do not wear a condom. · Testicular cancer exam. Ask your doctor whether you should check your testicles regularly. · Prostate exam. Talk to your doctor about whether you should have a blood test (called a PSA test) for prostate cancer. Experts differ on whether and when men should have this test. Some experts suggest it if you are older than 39 and are -American or have a father or brother who got prostate cancer when he was younger than 72. When should you call for help? Watch closely for changes in your health, and be sure to contact your doctor if you have any problems or symptoms that concern you. Where can you learn more? Go to http://elana-yash.info/. Enter P072 in the search box to learn more about \"Well Visit, Ages 25 to 48: Care Instructions. \"  Current as of: May 16, 2017  Content Version: 11.7  © 6198-3925 [employer]. Care instructions adapted under license by [employer] (which disclaims liability or warranty for this information). If you have questions about a medical condition or this instruction, always ask your healthcare professional. [address] any warranty or liability for your use of this information.

## 2018-10-19 ENCOUNTER — CLINICAL SUPPORT (OUTPATIENT)
Dept: OBGYN CLINIC | Age: 43
End: 2018-10-19

## 2018-10-19 VITALS
SYSTOLIC BLOOD PRESSURE: 118 MMHG | WEIGHT: 203 LBS | BODY MASS INDEX: 32.62 KG/M2 | HEIGHT: 66 IN | DIASTOLIC BLOOD PRESSURE: 74 MMHG

## 2018-10-19 DIAGNOSIS — Z30.42 ENCOUNTER FOR DEPO-PROVERA CONTRACEPTION: Primary | ICD-10-CM

## 2018-10-19 RX ORDER — MEDROXYPROGESTERONE ACETATE 150 MG/ML
150 INJECTION, SUSPENSION INTRAMUSCULAR ONCE
Qty: 1 ML | Refills: 0 | Status: SHIPPED | COMMUNITY
Start: 2018-10-19 | End: 2018-10-19

## 2018-10-19 NOTE — PROGRESS NOTES
Date last pap: 8/17/18. Last Depo-Provera: 7/27/18. Side Effects if any: no.  Serum HCG indicated? no.  Depo-Provera 150 mg IM given by: Citlaly Beltrán LPN. Next appointment due 1/4/19 - 1/18/19. After obtaining consent, and per orders of Dr Donnell Cao, injection of Depoprovera 150 MG given in right gluteus by Natasha Owens LPN. Patient instructed to remain in clinic for 20 minutes afterwards, and to report any adverse reaction to me immediately. Lot: [id number] Exp: 09/30/2022 ND: [address]-5 , VIS given.

## 2019-01-07 ENCOUNTER — CLINICAL SUPPORT (OUTPATIENT)
Dept: OBGYN CLINIC | Age: 44
End: 2019-01-07

## 2019-01-07 DIAGNOSIS — Z30.42 ENCOUNTER FOR MANAGEMENT AND INJECTION OF DEPO-PROVERA: Primary | ICD-10-CM

## 2019-01-07 NOTE — PROGRESS NOTES
Date last AE: 9/10/2018. Last Depo-Provera: 10/19/2018. Side Effects if any: N/A. Serum HCG indicated? N/A. Depo-Provera 150 mg IM given in left hip by: David Elaine LPN. Next appointment due: 03/25/2019 - 4/08/2019. Patient tolerated injection without difficulty. Patient given dates of next injection and encouraged to schedule next injection at check out. Patient verbalized understanding.

## 2019-03-04 ENCOUNTER — TELEPHONE (OUTPATIENT)
Dept: OBGYN CLINIC | Age: 44
End: 2019-03-04

## 2019-03-04 NOTE — TELEPHONE ENCOUNTER
Patient is concerned that she may have the beginnings of a yeast infection. She said that she recently took a double dosing of antibiotics and has started with itching and burning vaginally. No discharge at this time. Advised trying otc Monistat 7 at night and hydrocortisone cream during the day.   If the symptoms do not improve with OTC treatment, she will need to schedule a visit with TP.

## 2019-04-01 ENCOUNTER — CLINICAL SUPPORT (OUTPATIENT)
Dept: OBGYN CLINIC | Age: 44
End: 2019-04-01

## 2019-04-01 DIAGNOSIS — Z30.42 ENCOUNTER FOR DEPO-PROVERA CONTRACEPTION: Primary | ICD-10-CM

## 2019-04-01 RX ORDER — MEDROXYPROGESTERONE ACETATE 150 MG/ML
150 INJECTION, SUSPENSION INTRAMUSCULAR ONCE
Qty: 1 SYRINGE | Refills: 0
Start: 2019-04-01 | End: 2019-04-01 | Stop reason: CLARIF

## 2019-04-01 NOTE — PROGRESS NOTES
Pt here for nurse visit to continue Depo Provera. Date last AE: 9/10/2018. Last Pap: 8-17-17 Neg/Neg  Last Depo-Provera:  1-7-2019 on time today  Side Effects if any: amenorhea  Urine HCG indicated? no  Per MD order Depo-Provera 150 mg IM:given in right glut. By Carlos Boston LPN. Celeste Oquendo #:[id number]  Exp #:[id number]   NDC: [address]-6    Next Depo due: 06/17/19-07/01/19     Patient tolerated injection without difficulty. Patient given dates of next injection and encouraged to schedule next injection at check out. Patient verbalized understanding.

## 2019-06-26 ENCOUNTER — CLINICAL SUPPORT (OUTPATIENT)
Dept: OBGYN CLINIC | Age: 44
End: 2019-06-26

## 2019-06-26 VITALS
HEIGHT: 66 IN | DIASTOLIC BLOOD PRESSURE: 74 MMHG | BODY MASS INDEX: 34.15 KG/M2 | SYSTOLIC BLOOD PRESSURE: 116 MMHG | WEIGHT: 212.5 LBS

## 2019-06-26 DIAGNOSIS — Z30.42 ENCOUNTER FOR DEPO-PROVERA CONTRACEPTION: Primary | ICD-10-CM

## 2019-06-26 RX ORDER — MEDROXYPROGESTERONE ACETATE 150 MG/ML
150 INJECTION, SUSPENSION INTRAMUSCULAR ONCE
Qty: 1 ML | Refills: 0
Start: 2019-06-26 | End: 2019-06-26

## 2019-06-26 NOTE — PROGRESS NOTES
Date last pap: 8/17/17. Last Depo-Provera: 4/1/19. Side Effects if any: no.  Serum HCG indicated? no.  Depo-Provera 150 mg IM given by: Amber Kahn LPN. Next appointment due 9/11/19-9/25/19. After obtaining consent, and per orders of Dr Margaret Zhang, injection of Depoprovera 150 mg given in left gluteus by Fmei Gonzalez LPN. Patient instructed to remain in clinic for 20 minutes afterwards, and to report any adverse reaction to me immediately. Lot: [id number] Exp: 11/30/2022 [address]: [address]-8.

## 2019-09-09 NOTE — PATIENT INSTRUCTIONS
Well Visit, Ages 25 to 48: Care Instructions  Your Care Instructions    Physical exams can help you stay healthy. Your doctor has checked your overall health and may have suggested ways to take good care of yourself. He or she also may have recommended tests. At home, you can help prevent illness with healthy eating, regular exercise, and other steps. Follow-up care is a key part of your treatment and safety. Be sure to make and go to all appointments, and call your doctor if you are having problems. It's also a good idea to know your test results and keep a list of the medicines you take. How can you care for yourself at home? · Reach and stay at a healthy weight. This will lower your risk for many problems, such as obesity, diabetes, heart disease, and high blood pressure. · Get at least 30 minutes of physical activity on most days of the week. Walking is a good choice. You also may want to do other activities, such as running, swimming, cycling, or playing tennis or team sports. Discuss any changes in your exercise program with your doctor. · Do not smoke or allow others to smoke around you. If you need help quitting, talk to your doctor about stop-smoking programs and medicines. These can increase your chances of quitting for good. · Talk to your doctor about whether you have any risk factors for sexually transmitted infections (STIs). Having one sex partner (who does not have STIs and does not have sex with anyone else) is a good way to avoid these infections. · Use birth control if you do not want to have children at this time. Talk with your doctor about the choices available and what might be best for you. · Protect your skin from too much sun. When you're outdoors from 10 a.m. to 4 p.m., stay in the shade or cover up with clothing and a hat with a wide brim. Wear sunglasses that block UV rays. Even when it's cloudy, put broad-spectrum sunscreen (SPF 30 or higher) on any exposed skin.   · See a dentist one or two times a year for checkups and to have your teeth cleaned. · Wear a seat belt in the car. Follow your doctor's advice about when to have certain tests. These tests can spot problems early. For everyone  · Cholesterol. Have the fat (cholesterol) in your blood tested after age 21. Your doctor will tell you how often to have this done based on your age, family history, or other things that can increase your risk for heart disease. · Blood pressure. Have your blood pressure checked during a routine doctor visit. Your doctor will tell you how often to check your blood pressure based on your age, your blood pressure results, and other factors. · Vision. Talk with your doctor about how often to have a glaucoma test.  · Diabetes. Ask your doctor whether you should have tests for diabetes. · Colon cancer. Your risk for colorectal cancer gets higher as you get older. Some experts say that adults should start regular screening at age 48 and stop at age 76. Others say to start before age 48 or continue after age 76. Talk with your doctor about your risk and when to start and stop screening. For women  · Breast exam and mammogram. Talk to your doctor about when you should have a clinical breast exam and a mammogram. Medical experts differ on whether and how often women under 50 should have these tests. Your doctor can help you decide what is right for you. · Cervical cancer screening test and pelvic exam. Begin with a Pap test at age 24. The test often is part of a pelvic exam. Starting at age 27, you may choose to have a Pap test, an HPV test, or both tests at the same time (called co-testing). Talk with your doctor about how often to have testing. · Tests for sexually transmitted infections (STIs). Ask whether you should have tests for STIs. You may be at risk if you have sex with more than one person, especially if your partners do not wear condoms.   For men  · Tests for sexually transmitted infections (STIs). Ask whether you should have tests for STIs. You may be at risk if you have sex with more than one person, especially if you do not wear a condom. · Testicular cancer exam. Ask your doctor whether you should check your testicles regularly. · Prostate exam. Talk to your doctor about whether you should have a blood test (called a PSA test) for prostate cancer. Experts differ on whether and when men should have this test. Some experts suggest it if you are older than 39 and are -American or have a father or brother who got prostate cancer when he was younger than 72. When should you call for help? Watch closely for changes in your health, and be sure to contact your doctor if you have any problems or symptoms that concern you. Where can you learn more? Go to http://elana-yash.info/. Enter P072 in the search box to learn more about \"Well Visit, Ages 25 to 48: Care Instructions. \"  Current as of: December 13, 2018  Content Version: 12.1  © 5830-1364 [employer]. Care instructions adapted under license by [employer] (which disclaims liability or warranty for this information). If you have questions about a medical condition or this instruction, always ask your healthcare professional. [place] [address] any warranty or liability for your use of this information.

## 2019-09-11 ENCOUNTER — OFFICE VISIT (OUTPATIENT)
Dept: OBGYN CLINIC | Age: 44
End: 2019-09-11

## 2019-09-11 VITALS
WEIGHT: 213.2 LBS | HEIGHT: 66 IN | BODY MASS INDEX: 34.27 KG/M2 | SYSTOLIC BLOOD PRESSURE: 110 MMHG | DIASTOLIC BLOOD PRESSURE: 78 MMHG

## 2019-09-11 DIAGNOSIS — Z30.42 ENCOUNTER FOR DEPO-PROVERA CONTRACEPTION: ICD-10-CM

## 2019-09-11 DIAGNOSIS — Z01.419 ENCOUNTER FOR GYNECOLOGICAL EXAMINATION (GENERAL) (ROUTINE) WITHOUT ABNORMAL FINDINGS: Primary | ICD-10-CM

## 2019-09-11 RX ORDER — MEDROXYPROGESTERONE ACETATE 150 MG/ML
150 INJECTION, SUSPENSION INTRAMUSCULAR ONCE
Qty: 1 SYRINGE | Refills: 4
Start: 2019-09-11 | End: 2019-09-11

## 2019-09-11 RX ORDER — MEDROXYPROGESTERONE ACETATE 150 MG/ML
INJECTION, SUSPENSION INTRAMUSCULAR
Refills: 4 | COMMUNITY
Start: 2019-08-25 | End: 2019-09-11 | Stop reason: SDUPTHER

## 2019-09-11 NOTE — PROGRESS NOTES
Date last AE:9/11/19  Date last pap:8/17/17  Last Depo-Provera:6/26/2019  Side Effects if any: N/A. Urine HCG indicated? N/A within dates. Depo-Provera 150 mg IM  Right glut. given by: Tasha Hicks LPN. Lot: [id number]  Exp: 12/31/2023  NDC: [address]-9  : Pilo Buckner    Next appointment due:11/27/2019-12/11/2019. Administered 150mg/mL Depo-Provera per orders of Dr. Serenity Wesley . Verbal consent given by patient. Injection given. Patient tolerated well, no complications, no side effects. Encouraged to remain in clinic for 15 minutes afterwards, and to report any adverse reactions. Patient verbalized understanding.

## 2019-09-11 NOTE — PROGRESS NOTES
[facility]  [email or web address]  [phone]    Louis Bergeron MD, [address] [facility]       Annual Gynecologic Exam  WWE   Chief Complaint   Patient presents with    Well Woman       Vida Her is a 45 y.o.  WHITE OR   female who presents for an annual exam.  No LMP recorded. Patient has had an injection. .    She does not report additional concerns today. Menstrual status:  Her periods are spotting. She does not report dysmenorrhea/painful menses. She does not report irregular bleeding. Sexual history and Contraception:  Social History     Substance and Sexual Activity   Sexual Activity Yes    Partners: Male    Birth control/protection: Injection       She does not reports new sexual partner(s) in the last year. The patient does not request STD testing. Preventive Medicine History:  Her most recent Pap smear result: normal was obtained in 2017    Her most recent HR HPV screen was not obtained. She does not have a history of LUCINDA 2, 3 or cervical cancer. Breast health:  Last mammogram: approximate date 10/19/2018 and was normal.   A mammogram was not scheduled for today. Breast cancer family updated: see FH. Bone health: [facility] She does not have a history of osteopenia/osteoporosis. Osteoporosis family history updated: see FH.      Past Medical History:   Diagnosis Date    History of mammography, screening 10/19/2018    neg    Pap smear for cervical cancer screening 2017    Negative, HPV negative    Thyroid activity decreased      OB History    Para Term  AB Living   4 3 2   1 4   SAB TAB Ectopic Molar Multiple Live Births   1       1 4      # Outcome Date GA Lbr Isaak/2nd Weight Sex Delivery Anes PTL Lv   4 Term 14 38w6d 02:50 / 00:16 8 lb 3.4 oz (3.725 kg) F VACD EPIDURAL AN N BREE   3A Term 07/23/10 38w0d   F  LO EPI N BREE   3B Term 07/23/10    M  LO EPI N BREE   2 SAB 2009 1 Para 07    F VAGINAL DELI EPI N BREE      Birth Comments: Induced for post dates       Past Surgical History:   Procedure Laterality Date     DELIVERY ONLY      HX OTHER SURGICAL      D&C x 3    HX OTHER SURGICAL      Tonsillectomy as child     Family History   Problem Relation Age of Onset   Verbshea Bright Cancer Father         prostate    High Cholesterol Father     Cancer Sister         leukemia as child, breast -brca carrier    Diabetes Sister     Cancer Mother         Skin    Cancer Paternal Grandfather         Skin    Stroke Paternal Grandfather      Social History     Socioeconomic History    Marital status:      Spouse name: Not on file    Number of children: Not on file    Years of education: Not on file    Highest education level: Not on file   Occupational History    Not on file   Social Needs    Financial resource strain: Not on file    Food insecurity:     Worry: Not on file     Inability: Not on file    Transportation needs:     Medical: Not on file     Non-medical: Not on file   Tobacco Use    Smoking status: Never Smoker    Smokeless tobacco: Never Used   Substance and Sexual Activity    Alcohol use: No    Drug use: No    Sexual activity: Yes     Partners: Male     Birth control/protection: Injection   Lifestyle    Physical activity:     Days per week: Not on file     Minutes per session: Not on file    Stress: Not on file   Relationships    Social connections:     Talks on phone: Not on file     Gets together: Not on file     Attends Je[employer] witness service: Not on file     Active member of club or organization: Not on file     Attends meetings of clubs or organizations: Not on file     Relationship status: Not on file    Intimate partner violence:     Fear of current or ex partner: Not on file     Emotionally abused: Not on file     Physically abused: Not on file     Forced sexual activity: Not on file   Other Topics Concern    Not on file   Social History Narrative  Not on file       Allergies   Allergen Reactions    Penicillins Rash       Current Outpatient Medications   Medication Sig    medroxyPROGESTERone (DEPO-PROVERA) 150 mg/mL syrg 1 mL by IntraMUSCular route once for 1 dose.  cholecalciferol (VITAMIN D3) 1,000 unit tablet Take  by mouth daily.  PARoxetine (PAXIL) 10 mg tablet TAKE 1 TABLET BY MOUTH AT BEDTIME    calcium carbonate (TUMS) 200 mg calcium (500 mg) chew Take 1 Tab by mouth as needed. Indications: HEARTBURN    conjugated estrogens (PREMARIN) 1.25 mg tablet Take 1 Tab by mouth daily. x10-21 days prn breakthrough bleeding    norgestimate-ethinyl estradiol (TRI-SPRINTEC, 28,) 0.18/0.215/0.25 mg-35 mcg (28) tab Take 1 Tab by mouth daily. No current facility-administered medications for this visit.         Patient Active Problem List   Diagnosis Code    Unspecified failed trial of labor, antepartum O66.40    H/O:  Z98.891    Labor abnormal O62.9       Review of Systems - History obtained from the patient  Constitutional: negative for weight loss, fever, night sweats  HEENT: negative for hearing loss, earache, congestion, snoring, sorethroat  CV: negative for chest pain, palpitations, edema  Resp: negative for cough, shortness of breath, wheezing  GI: negative for change in bowel habits, abdominal pain, black or bloody stools  : negative for frequency, dysuria, hematuria, vaginal discharge  MSK: negative for back pain, joint pain, muscle pain  Breast: negative for breast lumps, nipple discharge, galactorrhea  Skin :negative for itching, rash, hives  Neuro: negative for dizziness, headache, confusion, weakness  Psych: negative for anxiety, depression, change in mood  Heme/lymph: negative for bleeding, bruising, pallor      (WWE continued)     Physical Exam  Visit Vitals  /78 (BP 1 Location: Right arm, BP Patient Position: Sitting)   Ht 5' 6\" (1.676 m)   Wt 213 lb 3.2 oz (96.7 kg)   BMI 34.41 kg/m² Constitutional  · Appearance: well-nourished, well developed, alert, in no acute distress    HENT  · Head and Face: appears normal    Neck  · Inspection/Palpation: normal appearance, no masses or tenderness  · Lymph Nodes: no lymphadenopathy present  · Thyroid: gland size normal, nontender, no nodules or masses present on palpation    Chest  · Respiratory Effort: breathing unlabored  · Auscultation: normal breath sounds    Cardiovascular  · Heart:  · Auscultation: regular rate and rhythm without murmur    Breasts  · Inspection of Breasts: breasts symmetrical, no skin changes, no discharge present, nipple appearance normal, no skin retraction present  · Palpation of Breasts and Axillae: no masses present on palpation, no breast tenderness  · Axillary Lymph Nodes: no lymphadenopathy present    Gastrointestinal  · Abdominal Examination: abdomen non-tender to palpation, normal bowel sounds, no masses present  · Liver and spleen: no hepatomegaly present, spleen not palpable  · Hernias: no hernias identified    Genitourinary  · External Genitalia: normal appearance for age, no discharge present, no tenderness present, no inflammatory lesions present, no masses present, without atrophy present  · Vagina: normal vaginal vault without central or paravaginal defects, no discharge present, no inflammatory lesions present, no masses present  · Bladder: non-tender to palpation  · Urethra: appears normal  · Cervix: normal   · Uterus: normal size, shape and consistency  · Adnexa: no adnexal tenderness present, no adnexal masses present  · Perineum: perineum within normal limits, no evidence of trauma, no rashes or skin lesions present  · Anus: anus within normal limits, no hemorrhoids present  · Inguinal Lymph Nodes: no lymphadenopathy present    Skin  · General Inspection: no rash, no lesions identified    Neurologic/Psychiatric  · Mental Status:  · Orientation: grossly oriented to person, place and time  · Mood and Affect: mood normal, affect appropriate    Assessment:  45 y.o.  for well woman exam  Encounter Diagnoses   Name Primary?  Encounter for Depo-Provera contraception     Encounter for gynecological examination (general) (routine) without abnormal findings Yes       Plan:  The patient was counseled about diet, exercise, healthy lifestyle  We discussed current self breast exam and mammogram recommendations  We discussed current pap smear and HR HPV testing guidelines  We recommend follow up in one year for routine annual gynecologic exam or sooner if needed  We recommend follow up with a primary care physician for any chronic medical problems or non-gynecologic concerns    We discussed calcium/vitamin D/weight bearing exercise and osteoporosis prevention, h/o given  Handouts were given to the patient  Disc risk of continued depo use including bone loss, pt wants to continue  MMG today after visit if able       Folllow up:  [x] return for annual well woman exam in one year or sooner if she is having problems  [] follow up and ultrasound  [x] mammogram  [] 6 months  [] 3 months  [] 1 month    Orders Placed This Encounter    THER/PROPH/DIAG INJECTION, SUBCUT/IM    [place] MEDROXYPROGESTERONE ACETATE, 1MG    medroxyPROGESTERone (DEPO-PROVERA) 150 mg/mL syrg       No results found for any visits on 19.

## 2019-11-18 RX ORDER — MEDROXYPROGESTERONE ACETATE 150 MG/ML
INJECTION, SUSPENSION INTRAMUSCULAR
Qty: 1 SYRINGE | Refills: 4 | Status: SHIPPED | OUTPATIENT
Start: 2019-11-18 | End: 2021-01-08 | Stop reason: SDUPTHER

## 2019-11-18 NOTE — TELEPHONE ENCOUNTER
45year old patient last seen in the office on 9/11/19.     Prescription resent as per MD order to patient preferred appointment

## 2019-11-29 RX ORDER — MEDROXYPROGESTERONE ACETATE 150 MG/ML
150 INJECTION, SUSPENSION INTRAMUSCULAR ONCE
Qty: 1 SYRINGE | Refills: 0 | Status: CANCELLED | COMMUNITY
Start: 2019-11-29 | End: 2019-11-29

## 2019-12-02 ENCOUNTER — CLINICAL SUPPORT (OUTPATIENT)
Dept: OBGYN CLINIC | Age: 44
End: 2019-12-02

## 2019-12-02 DIAGNOSIS — Z30.42 ENCOUNTER FOR DEPO-PROVERA CONTRACEPTION: Primary | ICD-10-CM

## 2019-12-02 NOTE — PROGRESS NOTES
Depo 150mg  administered intramuscularly in the left glute per Dr. Tadeo Flores with verbal consent received from patient and two patient identifiers used. No UPT, within dates. Patient tolerated well with no reactions observed for ten minutes post injection. Next injection dates ( 2/17/20- 3/3/20 ) were written down for the patient and the patient was encouraged to schedule next injection at checkout. Patient verbalized understanding.   Lot # [id number]   Exp- 9/30/23

## 2020-02-25 ENCOUNTER — CLINICAL SUPPORT (OUTPATIENT)
Dept: OBGYN CLINIC | Age: 45
End: 2020-02-25

## 2020-02-25 DIAGNOSIS — Z30.42 ENCOUNTER FOR MANAGEMENT AND INJECTION OF DEPO-PROVERA: Primary | ICD-10-CM

## 2020-02-25 NOTE — PROGRESS NOTES
Date last AE: 9/11/2019  Last Depo-Provera: 12/2/2019. Side Effects if any: N/A. Serum HCG indicated? N/A. Depo-Provera 150 mg IM given in right hip by: Dayan Dupree LPN. Next appointment due 05/12/2020 - 05/26/2020. Patient tolerated injection without difficulty. Patient given dates of next injection and encouraged to schedule next injection at check out. Patient verbalized understanding.

## 2020-05-14 ENCOUNTER — CLINICAL SUPPORT (OUTPATIENT)
Dept: OBGYN CLINIC | Age: 45
End: 2020-05-14

## 2020-05-14 VITALS
WEIGHT: 231 LBS | SYSTOLIC BLOOD PRESSURE: 132 MMHG | DIASTOLIC BLOOD PRESSURE: 80 MMHG | HEIGHT: 66 IN | BODY MASS INDEX: 37.12 KG/M2

## 2020-05-14 DIAGNOSIS — Z30.42 ENCOUNTER FOR MANAGEMENT AND INJECTION OF DEPO-PROVERA: Primary | ICD-10-CM

## 2020-05-14 NOTE — PROGRESS NOTES
Patient tolerated injection without difficulty. Patient given dates of next injection and encouraged to schedule next injection at check out. Patient verbalized understanding. Date last AE: 09/11/2019. Last Depo-Provera: 02/25/2020. Side Effects if any: N/A. Serum HCG indicated? N/A. Depo-Provera 150 mg IM given in Left hip by: German Lara. Next appointment due July 30, 2020 through August 13, 2020.

## 2020-07-31 ENCOUNTER — CLINICAL SUPPORT (OUTPATIENT)
Dept: OBGYN CLINIC | Age: 45
End: 2020-07-31

## 2020-07-31 VITALS
BODY MASS INDEX: 37.77 KG/M2 | DIASTOLIC BLOOD PRESSURE: 76 MMHG | SYSTOLIC BLOOD PRESSURE: 124 MMHG | WEIGHT: 235 LBS | HEIGHT: 66 IN

## 2020-07-31 DIAGNOSIS — Z30.42 ENCOUNTER FOR MANAGEMENT AND INJECTION OF DEPO-PROVERA: Primary | ICD-10-CM

## 2020-07-31 RX ORDER — MINERAL OIL
180 ENEMA (ML) RECTAL DAILY
COMMUNITY
Start: 2020-07-19

## 2020-07-31 NOTE — PROGRESS NOTES
After obtaining consent, and per orders of Dr. Destin Young, injection of  Depo given Right Gluteus by Hunter Pereira. Patient instructed to remain in clinic for 20 minutes afterwards, and to report any adverse reaction to me immediately.

## 2020-10-21 ENCOUNTER — CLINICAL SUPPORT (OUTPATIENT)
Dept: OBGYN CLINIC | Age: 45
End: 2020-10-21
Payer: COMMERCIAL

## 2020-10-21 VITALS
SYSTOLIC BLOOD PRESSURE: 130 MMHG | HEIGHT: 66 IN | BODY MASS INDEX: 41.3 KG/M2 | WEIGHT: 257 LBS | DIASTOLIC BLOOD PRESSURE: 84 MMHG

## 2020-10-21 DIAGNOSIS — Z30.42 ENCOUNTER FOR MANAGEMENT AND INJECTION OF DEPO-PROVERA: Primary | ICD-10-CM

## 2020-10-21 PROCEDURE — 96372 THER/PROPH/DIAG INJ SC/IM: CPT | Performed by: OBSTETRICS & GYNECOLOGY

## 2020-10-21 NOTE — PROGRESS NOTES
After obtaining consent, and per orders of Dr. Rhea James, injection of Depo given in left gluteus by Hanny Cruz. Patient instructed to remain in clinic for 20 minutes afterwards, and to report any adverse reaction to me immediately. Date last AE: 09/11/2019. Last Depo-Provera: 07/31/2020. Side Effects if any: N/A. Serum HCG indicated? N/A. Depo-Provera 150 mg IM given in left deltoid by: Pat Mcbride CMA. Next appointment due January 6, 2021- January 20, 2021.

## 2020-11-12 ENCOUNTER — TELEPHONE (OUTPATIENT)
Dept: OBGYN CLINIC | Age: 45
End: 2020-11-12

## 2020-11-12 NOTE — TELEPHONE ENCOUNTER
Patient of TP    Calling to say that she typically does not have problems with her depo provera but this month she has had some BTB. She said she has passed tiny clots and mostly sees the bleeding with wiping with no complaints of pain. Super high anxiety regarding any changes. Advised that BTB can occur with any birth control. We discussed to check UPT and if negative, try Ibuprofen protocol 800 mg q8 hrs x 72 hrs. Discuss bleeding and pain protocol to follow. Reminded patient she is overdue. Requested for her to make note on journal of s/s daily to share with TP at annual.  She is scheduled due  for her next Depo 1/6/21-1/20/21. She asked to schedule her Mammo, AE and Depo injection in same date. We scheduled for 1/11/21  7:40 mammo and 8:50 ae/depo. Call if s/s worsens.

## 2021-01-04 ENCOUNTER — TELEPHONE (OUTPATIENT)
Dept: OBGYN CLINIC | Age: 46
End: 2021-01-04

## 2021-01-04 RX ORDER — MEDROXYPROGESTERONE ACETATE 150 MG/ML
150 INJECTION, SUSPENSION INTRAMUSCULAR ONCE
Qty: 1 ML | Refills: 0 | Status: SHIPPED | OUTPATIENT
Start: 2021-01-04 | End: 2021-01-04

## 2021-01-04 NOTE — TELEPHONE ENCOUNTER
Patient calling to request refill of her Depo when she has her annual on 1/11/21 with TP      Pharmacy used in past [facility] [phone]

## 2021-01-08 NOTE — PATIENT INSTRUCTIONS
Well Visit, Ages 25 to 48: Care Instructions Your Care Instructions Physical exams can help you stay healthy. Your doctor has checked your overall health and may have suggested ways to take good care of yourself. He or she also may have recommended tests. At home, you can help prevent illness with healthy eating, regular exercise, and other steps. Follow-up care is a key part of your treatment and safety. Be sure to make and go to all appointments, and call your doctor if you are having problems. It's also a good idea to know your test results and keep a list of the medicines you take. How can you care for yourself at home? · Reach and stay at a healthy weight. This will lower your risk for many problems, such as obesity, diabetes, heart disease, and high blood pressure. · Get at least 30 minutes of physical activity on most days of the week. Walking is a good choice. You also may want to do other activities, such as running, swimming, cycling, or playing tennis or team sports. Discuss any changes in your exercise program with your doctor. · Do not smoke or allow others to smoke around you. If you need help quitting, talk to your doctor about stop-smoking programs and medicines. These can increase your chances of quitting for good. · Talk to your doctor about whether you have any risk factors for sexually transmitted infections (STIs). Having one sex partner (who does not have STIs and does not have sex with anyone else) is a good way to avoid these infections. · Use birth control if you do not want to have children at this time. Talk with your doctor about the choices available and what might be best for you. · Protect your skin from too much sun. When you're outdoors from 10 a.m. to 4 p.m., stay in the shade or cover up with clothing and a hat with a wide brim. Wear sunglasses that block UV rays. Even when it's cloudy, put broad-spectrum sunscreen (SPF 30 or higher) on any exposed skin. · See a dentist one or two times a year for checkups and to have your teeth cleaned. · Wear a seat belt in the car. Follow your doctor's advice about when to have certain tests. These tests can spot problems early. For everyone · Cholesterol. Have the fat (cholesterol) in your blood tested after age 21. Your doctor will tell you how often to have this done based on your age, family history, or other things that can increase your risk for heart disease. · Blood pressure. Have your blood pressure checked during a routine doctor visit. Your doctor will tell you how often to check your blood pressure based on your age, your blood pressure results, and other factors. · Vision. Talk with your doctor about how often to have a glaucoma test. 
· Diabetes. Ask your doctor whether you should have tests for diabetes. · Colon cancer. Your risk for colorectal cancer gets higher as you get older. Some experts say that adults should start regular screening at age 48 and stop at age 76. Others say to start before age 48 or continue after age 76. Talk with your doctor about your risk and when to start and stop screening. For women · Breast exam and mammogram. Talk to your doctor about when you should have a clinical breast exam and a mammogram. Medical experts differ on whether and how often women under 50 should have these tests. Your doctor can help you decide what is right for you. · Cervical cancer screening test and pelvic exam. Begin with a Pap test at age 24. The test often is part of a pelvic exam. Starting at age 27, you may choose to have a Pap test, an HPV test, or both tests at the same time (called co-testing). Talk with your doctor about how often to have testing. · Tests for sexually transmitted infections (STIs). Ask whether you should have tests for STIs. You may be at risk if you have sex with more than one person, especially if your partners do not wear condoms. For men · Tests for sexually transmitted infections (STIs). Ask whether you should have tests for STIs. You may be at risk if you have sex with more than one person, especially if you do not wear a condom. · Testicular cancer exam. Ask your doctor whether you should check your testicles regularly. · Prostate exam. Talk to your doctor about whether you should have a blood test (called a PSA test) for prostate cancer. Experts differ on whether and when men should have this test. Some experts suggest it if you are older than 39 and are -American or have a father or brother who got prostate cancer when he was younger than 72. When should you call for help? Watch closely for changes in your health, and be sure to contact your doctor if you have any problems or symptoms that concern you. Where can you learn more? Go to http://www.zuluaga.com/ Enter P072 in the search box to learn more about \"Well Visit, Ages 25 to 48: Care Instructions. \" Current as of: May 27, 2020               Content Version: 12.6 © 2006-2020 [employer]. Care instructions adapted under license by [employer] (which disclaims liability or warranty for this information). If you have questions about a medical condition or this instruction, always ask your healthcare professional. [address] any warranty or liability for your use of this information.

## 2021-01-11 ENCOUNTER — OFFICE VISIT (OUTPATIENT)
Dept: OBGYN CLINIC | Age: 46
End: 2021-01-11
Payer: COMMERCIAL

## 2021-01-11 VITALS
DIASTOLIC BLOOD PRESSURE: 86 MMHG | SYSTOLIC BLOOD PRESSURE: 126 MMHG | BODY MASS INDEX: 37.93 KG/M2 | HEIGHT: 66 IN | WEIGHT: 236 LBS

## 2021-01-11 DIAGNOSIS — Z01.419 ENCOUNTER FOR GYNECOLOGICAL EXAMINATION: Primary | ICD-10-CM

## 2021-01-11 DIAGNOSIS — Z30.42 ENCOUNTER FOR DEPO-PROVERA CONTRACEPTION: ICD-10-CM

## 2021-01-11 PROCEDURE — 96372 THER/PROPH/DIAG INJ SC/IM: CPT | Performed by: OBSTETRICS & GYNECOLOGY

## 2021-01-11 PROCEDURE — 99396 PREV VISIT EST AGE 40-64: CPT | Performed by: OBSTETRICS & GYNECOLOGY

## 2021-01-11 RX ORDER — MEDROXYPROGESTERONE ACETATE 150 MG/ML
INJECTION, SUSPENSION INTRAMUSCULAR
Qty: 1 SYRINGE | Refills: 4 | Status: SHIPPED | OUTPATIENT
Start: 2021-01-11 | End: 2022-01-11 | Stop reason: SDUPTHER

## 2021-01-11 RX ORDER — MEDROXYPROGESTERONE ACETATE 150 MG/ML
150 INJECTION, SUSPENSION INTRAMUSCULAR ONCE
Status: COMPLETED | OUTPATIENT
Start: 2021-01-11 | End: 2021-01-11

## 2021-01-11 RX ADMIN — MEDROXYPROGESTERONE ACETATE 150 MG: 150 INJECTION, SUSPENSION INTRAMUSCULAR at 08:55

## 2021-01-11 NOTE — PROGRESS NOTES
[facility]  [email or web address]  [phone]    Tricia King MD, [address] [facility]       Annual Gynecologic Exam  WWE 40-60  Chief Complaint   Patient presents with    Annual Exam       Brianna Vasquez is a 39 y.o.  WHITE OR   female who presents for an annual exam.  No LMP recorded. Patient has had an injection. .    She does not report additional concerns today. Had spotting one time. Menstrual status:  Her periods are regular cycles. She does not report dysmenorrhea/painful menses. She does not report irregular bleeding. Sexual history and Contraception:  Social History     Substance and Sexual Activity   Sexual Activity Yes    Partners: Male    Birth control/protection: Injection       She does not reports new sexual partner(s) in the last year. The patient does not request STD testing. Preventive Medicine History:  Her most recent Pap smear result: normal was obtained in 2017    Her most recent HR HPV screen was Negative obtained in     She does not have a history of LUCINDA 2, 3 or cervical cancer. Breast health:  Last mammogram: was normal.   A mammogram was not scheduled for today. Breast cancer family updated: see FH. Bone health: Swapna Limon She does not have a history of osteopenia/osteoporosis. Osteoporosis family history updated: see FH.      Past Medical History:   Diagnosis Date    History of mammography, screening 19,10/19/2018    neg    Pap smear for cervical cancer screening 2017    Negative, HPV negative    Thyroid activity decreased      OB History    Para Term  AB Living   4 3 2   1 4   SAB TAB Ectopic Molar Multiple Live Births   1       1 4      # Outcome Date GA Lbr Isaak/2nd Weight Sex Delivery Anes PTL Lv   4 Term 14 38w6d 02:50 / 00:16 8 lb 3.4 oz (3.725 kg) F VACD EPIDURAL AN N BREE   3A Term 07/23/10 38w0d   F  LO EPI N BREE   3B Term 07/23/10    M  LO EPI N BREE   2 SAB 2009           1 Para 07    F VAGINAL DELI EPI N BREE      Birth Comments: Induced for post dates       Past Surgical History:   Procedure Laterality Date    HX OTHER SURGICAL      D&C x 3    HX OTHER SURGICAL      Tonsillectomy as child    NH  DELIVERY ONLY       Family History   Problem Relation Age of Onset   Deedee Real Cancer Father         prostate    High Cholesterol Father     Cancer Sister         leukemia as child, breast -brca carrier    Diabetes Sister     Cancer Mother         Skin    Cancer Paternal Grandfather         Skin    Stroke Paternal Grandfather      Social History     Socioeconomic History    Marital status:      Spouse name: Not on file    Number of children: Not on file    Years of education: Not on file    Highest education level: Not on file   Occupational History    Not on file   Social Needs    Financial resource strain: Not on file    Food insecurity     Worry: Not on file     Inability: Not on file    Transportation needs     Medical: Not on file     Non-medical: Not on file   Tobacco Use    Smoking status: Never Smoker    Smokeless tobacco: Never Used   Substance and Sexual Activity    Alcohol use: No    Drug use: No    Sexual activity: Yes     Partners: Male     Birth control/protection: Injection   Lifestyle    Physical activity     Days per week: Not on file     Minutes per session: Not on file    Stress: Not on file   Relationships    Social connections     Talks on phone: Not on file     Gets together: Not on file     Attends Orthodox service: Not on file     Active member of club or organization: Not on file     Attends meetings of clubs or organizations: Not on file     Relationship status: Not on file    Intimate partner violence     Fear of current or ex partner: Not on file     Emotionally abused: Not on file     Physically abused: Not on file     Forced sexual activity: Not on file   Other Topics Concern    Not on file   Social History Narrative    Not on file       Allergies   Allergen Reactions    Penicillins Rash       Current Outpatient Medications   Medication Sig    medroxyPROGESTERone (DEPO-PROVERA) 150 mg/mL syrg INJECT 1 ML BY INTRAMUSCULAR ROUTE ONCE FOR 1 DOSE.  fexofenadine (ALLEGRA) 180 mg tablet Take 180 mg by mouth daily.  cholecalciferol (VITAMIN D3) 1,000 unit tablet Take  by mouth daily.  PARoxetine (PAXIL) 10 mg tablet TAKE 1 TABLET BY MOUTH AT BEDTIME    calcium carbonate (TUMS) 200 mg calcium (500 mg) chew Take 1 Tab by mouth as needed. Indications: HEARTBURN    conjugated estrogens (PREMARIN) 1.25 mg tablet Take 1 Tab by mouth daily. x10-21 days prn breakthrough bleeding    norgestimate-ethinyl estradiol (TRI-SPRINTEC, 28,) 0.18/0.215/0.25 mg-35 mcg (28) tab Take 1 Tab by mouth daily.      Current Facility-Administered Medications   Medication Dose Route Frequency    medroxyPROGESTERone (DEPO-PROVERA) 150 mg/mL injection 150 mg  150 mg IntraMUSCular ONCE       Patient Active Problem List   Diagnosis Code    Unspecified failed trial of labor, antepartum O66.40    H/O:  Z98.891    Labor abnormal O62.9       Review of Systems - History obtained from the patient  Constitutional/general, HEENT, CV, Resp, GI, MSK, Neuro, Psych, Heme/lymph, Skin, Breast ROS: no significant complaints except as noted on HPI       Physical Exam  Visit Vitals  /86 (BP 1 Location: Right arm, BP Patient Position: Sitting)   Ht 5' 6\" (1.676 m)   Wt 236 lb (107 kg)   BMI 38.09 kg/m²       Constitutional  · Appearance: well-nourished, well developed, alert, in no acute distress    HENT  · Head and Face: appears normal    Neck  · Inspection/Palpation: normal appearance, no masses or tenderness  · Lymph Nodes: no lymphadenopathy present  · Thyroid: gland size normal, nontender, no nodules or masses present on palpation    Chest  · Respiratory Effort: breathing unlabored  · Auscultation: normal breath sounds    Cardiovascular  · Heart:  · Auscultation: regular rate and rhythm without murmur    Breasts  · Inspection of Breasts: breasts symmetrical, no skin changes, no discharge present, nipple appearance normal, no skin retraction present  · Palpation of Breasts and Axillae: no masses present on palpation, no breast tenderness  · Axillary Lymph Nodes: no lymphadenopathy present    Gastrointestinal  · Abdominal Examination: abdomen non-tender to palpation, normal bowel sounds, no masses present  · Liver and spleen: no hepatomegaly present, spleen not palpable  · Hernias: no hernias identified    Genitourinary  · External Genitalia: normal appearance for age, no discharge present, no tenderness present, no inflammatory lesions present, no masses present, without atrophy present  · Vagina: normal vaginal vault without central or paravaginal defects, no discharge present, no inflammatory lesions present, no masses present  · Bladder: non-tender to palpation  · Urethra: appears normal  · Cervix: normal   · Uterus: normal size, shape and consistency  · Adnexa: no adnexal tenderness present, no adnexal masses present  · Perineum: perineum within normal limits, no evidence of trauma, no rashes or skin lesions present  · Anus: anus within normal limits, no hemorrhoids present  · Inguinal Lymph Nodes: no lymphadenopathy present    Skin  · General Inspection: no rash, no lesions identified    Neurologic/Psychiatric  · Mental Status:  · Orientation: grossly oriented to person, place and time  · Mood and Affect: mood normal, affect appropriate    Assessment:  39 y.o.  for well woman exam  Encounter Diagnoses   Name Primary?  Encounter for gynecological examination Yes    Encounter for Depo-Provera contraception        Plan:  The patient was counseled about healthy lifestyle, disease prevention, and bone protection.   Healthy you hand out given  Notify MD if AUB recurs/persists  We discussed current self breast exam and mammogram recommendations  We discussed current pap smear and HR HPV testing guidelines  We recommend follow up in one year for routine annual gynecologic exam or sooner if needed  Handouts were given to the patient  We recommend follow up with a primary care physician for chronic medical problems and evaluation of non-gynecologic concerns and to please contact our office with any GYN questions or concerns. Pt wants to continue depo provera     Folllow up:  [x] return for annual well woman exam in one year or sooner if she is having problems  [] follow up and ultrasound   [x] mammogram  [] 6 months  [] 3 months  [] 1 month    Orders Placed This Encounter    medroxyPROGESTERone (DEPO-PROVERA) 150 mg/mL syrg    medroxyPROGESTERone (DEPO-PROVERA) 150 mg/mL injection 150 mg    PAP IG, APTIMA HPV AND RFX 16/18,45 ([id number])       No results found for any visits on 01/11/21.

## 2021-01-14 LAB
CYTOLOGIST CVX/VAG CYTO: NORMAL
CYTOLOGY CVX/VAG DOC CYTO: NORMAL
CYTOLOGY CVX/VAG DOC THIN PREP: NORMAL
CYTOLOGY HISTORY:: NORMAL
DX ICD CODE: NORMAL
HPV I/H RISK 4 DNA CVX QL PROBE+SIG AMP: NEGATIVE
Lab: NORMAL
OTHER STN SPEC: NORMAL
STAT OF ADQ CVX/VAG CYTO-IMP: NORMAL

## 2021-04-05 ENCOUNTER — OFFICE VISIT (OUTPATIENT)
Dept: OBGYN CLINIC | Age: 46
End: 2021-04-05
Payer: COMMERCIAL

## 2021-04-05 DIAGNOSIS — Z30.42 ENCOUNTER FOR DEPO-PROVERA CONTRACEPTION: Primary | ICD-10-CM

## 2021-04-05 DIAGNOSIS — N91.2 AMENORRHEA DUE TO DEPO PROVERA: ICD-10-CM

## 2021-04-05 PROCEDURE — 96372 THER/PROPH/DIAG INJ SC/IM: CPT | Performed by: OBSTETRICS & GYNECOLOGY

## 2021-04-05 RX ORDER — MEDROXYPROGESTERONE ACETATE 150 MG/ML
150 INJECTION, SUSPENSION INTRAMUSCULAR ONCE
Status: COMPLETED | OUTPATIENT
Start: 2021-04-05 | End: 2021-04-05

## 2021-04-05 RX ADMIN — MEDROXYPROGESTERONE ACETATE 150 MG: 150 INJECTION, SUSPENSION INTRAMUSCULAR at 09:01

## 2021-04-05 NOTE — PROGRESS NOTES
Date last pap: 1/11/21  Last Depo-Provera: 1/11/21  Side Effects if any: none  Serum HCG indicated? N/A, within dates  Depo-Provera 150 mg IM given by: Isa Gaxiola LPN  Next appointment due 6/21/21-7/5/21    Administered 150mg/mL Depo-Provera per MD order. Verbal consent given by patient. Injection given IM in the left gluteus . Patient tolerated well, no complications, no side effects. Lot # [id number]  Exp: 1/31/2025    Advised patient dates for next depo injection.  Patient verbalized understanding

## 2021-04-05 NOTE — PATIENT INSTRUCTIONS
Learning About Birth Control: The Shot What is the shot? The shot is used to prevent pregnancy. You get the shot in your upper arm or rear end (buttocks). The shot gives you a dose of the hormone progestin. The shot is often called by its brand name, Depo-Provera. Progestin prevents pregnancy in these ways: It thickens the mucus in the cervix. This makes it hard for sperm to travel into the uterus. It also thins the lining of the uterus, which makes it harder for a fertilized egg to attach to the uterus. Progestin can sometimes stop the ovaries from releasing an egg each month (ovulation). The shot provides birth control for 3 months at a time. You then need another shot. The shot may cause bone loss. Talk to your doctor about the risks and benefits. How well does it work? In the first year of use: · When the shot is used exactly as directed, fewer than 1 woman out of 100 has an unplanned pregnancy. · When the shot is not used exactly as directed, 6 women out of 100 have an unplanned pregnancy. Be sure to tell your doctor about any health problems you have or medicines you take. He or she can help you choose the birth control method that is right for you. What are the advantages of the shot? · The shot is one of the most effective methods of birth control. · It's convenient. You need to get a shot only once every 3 months to prevent pregnancy. You don't have to interrupt sex to protect against pregnancy. · It prevents pregnancy for 3 months at a time. You don't have to worry about birth control for this time. · It's safe to use while breastfeeding. · The shot may reduce heavy bleeding and cramping. · The shot doesn't contain estrogen. So you can use it if you don't want to take estrogen or can't take estrogen because you have certain health problems or concerns. What are the disadvantages of the shot?  
· The shot doesn't protect against sexually transmitted infections (STIs), such as herpes or HIV/AIDS. If you aren't sure if your sex partner might have an STI, use a condom to protect against disease. · The shot may cause bone loss in some women. Talk to your doctor about the risks and benefits. · The shot is needed every 3 months. Any side effects may last 3 months or longer. ? The shot may cause irregular periods, or you may have spotting between periods. You may also stop getting a period. Some women see having no period as an advantage. ? It may cause mood changes, less interest in sex, or weight gain. · If you want to get pregnant, it may take up to 18 months after you stop getting the shot. This is because the hormones the shot provided have to leave your system, and your body has to readjust. 
Where can you learn more? Go to http://www.gray.com/ Enter X577 in the search box to learn more about \"Learning About Birth Control: The Shot. \" Current as of: October 8, 2020               Content Version: 12.8 © 2006-2021 Healthwise, [facility]. Care instructions adapted under license by [employer] (which disclaims liability or warranty for this information). If you have questions about a medical condition or this instruction, always ask your healthcare professional. [address] any warranty or liability for your use of this information.

## 2021-05-14 ENCOUNTER — OFFICE VISIT (OUTPATIENT)
Dept: SURGERY | Age: 46
End: 2021-05-14
Payer: COMMERCIAL

## 2021-05-14 VITALS
HEIGHT: 66 IN | WEIGHT: 220 LBS | SYSTOLIC BLOOD PRESSURE: 126 MMHG | BODY MASS INDEX: 35.36 KG/M2 | DIASTOLIC BLOOD PRESSURE: 86 MMHG

## 2021-05-14 DIAGNOSIS — N60.11 FIBROCYSTIC BREAST CHANGES OF BOTH BREASTS: Primary | ICD-10-CM

## 2021-05-14 DIAGNOSIS — Z80.3 FAMILY HISTORY OF BREAST CANCER: ICD-10-CM

## 2021-05-14 DIAGNOSIS — N60.12 FIBROCYSTIC BREAST CHANGES OF BOTH BREASTS: Primary | ICD-10-CM

## 2021-05-14 DIAGNOSIS — Z91.89 AT HIGH RISK FOR BREAST CANCER: ICD-10-CM

## 2021-05-14 PROCEDURE — 99204 OFFICE O/P NEW MOD 45 MIN: CPT | Performed by: NURSE PRACTITIONER

## 2021-05-14 NOTE — PATIENT INSTRUCTIONS

## 2021-05-14 NOTE — PROGRESS NOTES
HISTORY OF PRESENT ILLNESS  Alex Matthews is a 39 y.o. female. HPI New patient presents for evaluation of her family history of breast cancer. Denies breast mass, skin changes, nipple discharge and pain. Breast history -   Referring - Dr. Chani Reese  No history of breast biopsies    Family history -   Father - prostate cancer  Sister - breast cancer at 40; reports BRCA negative; also had leukemia at 15 and was treated with radiation  Grandfather - colon cancer  Grandmother - lung cancer  2021 -  The patient's risk of breast cancer was calculated using the [address] [place] [address] Street. Her 10 year risk is 6.7% (compared with a population risk of 2%). Her lifetime risk is 36% (compared with a population risk of 12.6%). OB History        4    Para   3    Term   2            AB   1    Living   4       SAB   1    TAB        Ectopic        Molar        Multiple   1    Live Births   4          Obstetric Comments   Menarche 17, LMP , # of children 3, age of 4st delivery 36, Hysterectomy/oophorectomy No/No, Breast bx No, history of breast feeding Yes, BCP No, Hormone therapy Yes             Past Surgical History:   Procedure Laterality Date    HX OTHER SURGICAL      D&C x 3    HX OTHER SURGICAL      Tonsillectomy as child     [facility] Results (most recent):  Results from Hospital Encounter encounter on 05/10/21   [facility] 3D LLOYD W MAMMO BI SCREENING INCL CAD    Narrative STUDY: Bilateral digital screening mammogram with 3-D tomosynthesis    INDICATION:  Screening. COMPARISON: Most recent     BREAST COMPOSITION: The breasts are heterogeneously dense, which may obscure  small masses. FINDINGS: Bilateral digital screening mammography was performed and is  interpreted in conjunction with a computer assisted detection (CAD) system. Additionally, tomosynthesis of both breasts in the CC and MLO projections was  performed.  No suspicious masses or calcifications are identified. There has been  no significant change. Impression BI-RADS 1: Negative. No mammographic evidence of malignancy. RECOMMENDATIONS:  Next screening mammogram is recommended in one year. The patient will be notified of these results. ROS    Physical Exam  Constitutional:       Appearance: Normal appearance. Chest:      Breasts:         Right: No mass, nipple discharge, skin change or tenderness. Left: No mass, nipple discharge, skin change or tenderness. Musculoskeletal: Normal range of motion. Comments: UE x 2   Lymphadenopathy:      Upper Body:      Right upper body: No supraclavicular or axillary adenopathy. Left upper body: No supraclavicular or axillary adenopathy. Neurological:      Mental Status: She is alert. Psychiatric:         Attention and Perception: Attention normal.         Mood and Affect: Mood normal.         Speech: Speech normal.         Behavior: Behavior normal.       Visit Vitals  /86   Ht 5' 6\" (1.676 m)   Wt 220 lb (99.8 kg)   BMI 35.51 kg/m²         ASSESSMENT and PLAN  Encounter Diagnoses   Name Primary?  Fibrocystic breast changes of both breasts Yes    Family history of breast cancer     At high risk for breast cancer        Normal exam and imaging with no evidence of breast malignancy. We reviewed the patient's history and her risk. The patient's risk of breast cancer was calculated using the [address] [place] [address] Street. Her 10 year risk is 6.7% (compared with a population risk of 2%). Her lifetime risk is 36% (compared with a population risk of 12.6%). We discussed being followed as a high risk patient with yearly mammograms, yearly breast MRIs and an annual CBE here. Reviewed different types of breast imaging - mammogram, US, ABUS and breast MRI. Discussed information gained from the different modalities. Also reviewed breast density and how that impact breast imaging and breast cancer risk.     Will continue annual 3D mammograms. Will contact her insurance about a high risk screening MRI. If she opts not to get a screening breast MRI, she will consider ABUS. Discussed genetic testing options. She will talk to her sister who had genetic testing about 12 years ago at the time of her diagnosis. She will ask her about the [employer] update. If her sister does not get updated testing, discussed [facility] home testing if she would like to do that. She will contact the office if she would like to pursue genetic testing. BSmammogram 3D in 1 year. RTC in 1 year or sooner PRN. She is comfortable with this plan. All questions answered and she stated understanding. Total time spent for this patient - 45 minutes.

## 2021-06-21 ENCOUNTER — CLINICAL SUPPORT (OUTPATIENT)
Dept: OBGYN CLINIC | Age: 46
End: 2021-06-21
Payer: COMMERCIAL

## 2021-06-21 VITALS
HEART RATE: 74 BPM | BODY MASS INDEX: 33.56 KG/M2 | SYSTOLIC BLOOD PRESSURE: 125 MMHG | DIASTOLIC BLOOD PRESSURE: 75 MMHG | HEIGHT: 66 IN | WEIGHT: 208.8 LBS

## 2021-06-21 DIAGNOSIS — Z30.42 ENCOUNTER FOR MANAGEMENT AND INJECTION OF DEPO-PROVERA: ICD-10-CM

## 2021-06-21 DIAGNOSIS — Z76.89 ENCOUNTER FOR MENSTRUAL REGULATION: Primary | ICD-10-CM

## 2021-06-21 PROCEDURE — 96372 THER/PROPH/DIAG INJ SC/IM: CPT | Performed by: OBSTETRICS & GYNECOLOGY

## 2021-06-21 RX ORDER — MEDROXYPROGESTERONE ACETATE 150 MG/ML
150 INJECTION, SUSPENSION INTRAMUSCULAR ONCE
Status: COMPLETED | OUTPATIENT
Start: 2021-06-21 | End: 2021-06-21

## 2021-06-21 RX ADMIN — MEDROXYPROGESTERONE ACETATE 150 MG: 150 INJECTION, SUSPENSION INTRAMUSCULAR at 09:10

## 2021-06-21 NOTE — PROGRESS NOTES
Last Depo-Provera injection: 1/11/2021   Side Effects if any: none  Serum HCG indicated? Depo-Provera 150 mg IM given by: Sade Balderas in gluteus ( right). Next Depo-Provera injection due: 09/06/2021-09/20/2021    Administered 150mg/mL Depo-Provera per orders of Dr. Bceca Beaver. Verbal consent received by Ms.  Vu Parent & injection given. Patient tolerated well, no complications and no side effects. Encouraged her to remain in clinic for 15 minutes and immediately report any adverse reactions. Patient informed of next injection date ranges and encouraged to schedule. She verbalized understanding and expressed intent to comply.

## 2021-06-21 NOTE — PATIENT INSTRUCTIONS
Learning About Birth Control: The Shot What is the shot? The shot is used to prevent pregnancy. You get the shot in your upper arm or rear end (buttocks). The shot gives you a dose of the hormone progestin. The shot is often called by its brand name, Depo-Provera. Progestin prevents pregnancy in these ways: It thickens the mucus in the cervix. This makes it hard for sperm to travel into the uterus. It also thins the lining of the uterus, which makes it harder for a fertilized egg to attach to the uterus. Progestin can sometimes stop the ovaries from releasing an egg each month (ovulation). The shot provides birth control for 3 months at a time. You then need another shot. The shot may cause bone loss. Talk to your doctor about the risks and benefits. How well does it work? In the first year of use: · When the shot is used exactly as directed, fewer than 1 woman out of 100 has an unplanned pregnancy. · When the shot is not used exactly as directed, 6 women out of 100 have an unplanned pregnancy. Be sure to tell your doctor about any health problems you have or medicines you take. He or she can help you choose the birth control method that is right for you. What are the advantages of the shot? · The shot is one of the most effective methods of birth control. · It's convenient. You need to get a shot only once every 3 months to prevent pregnancy. You don't have to interrupt sex to protect against pregnancy. · It prevents pregnancy for 3 months at a time. You don't have to worry about birth control for this time. · It's safe to use while breastfeeding. · The shot may reduce heavy bleeding and cramping. · The shot doesn't contain estrogen. So you can use it if you don't want to take estrogen or can't take estrogen because you have certain health problems or concerns. What are the disadvantages of the shot?  
· The shot doesn't protect against sexually transmitted infections (STIs), such as herpes or HIV/AIDS. If you aren't sure if your sex partner might have an STI, use a condom to protect against disease. · The shot may cause bone loss in some women. Talk to your doctor about the risks and benefits. · The shot is needed every 3 months. Any side effects may last 3 months or longer. ? The shot may cause irregular periods, or you may have spotting between periods. You may also stop getting a period. Some women see having no period as an advantage. ? It may cause mood changes, less interest in sex, or weight gain. · If you want to get pregnant, it may take up to 18 months after you stop getting the shot. This is because the hormones the shot provided have to leave your system, and your body has to readjust. 
Where can you learn more? Go to http://www.gray.com/ Enter W821 in the search box to learn more about \"Learning About Birth Control: The Shot. \" Current as of: October 8, 2020               Content Version: 12.8 © 2006-2021 Healthwise, [facility]. Care instructions adapted under license by [employer] (which disclaims liability or warranty for this information). If you have questions about a medical condition or this instruction, always ask your healthcare professional. [address] any warranty or liability for your use of this information.

## 2021-08-03 ENCOUNTER — TELEPHONE (OUTPATIENT)
Dept: OBGYN CLINIC | Age: 46
End: 2021-08-03

## 2021-08-03 NOTE — TELEPHONE ENCOUNTER
Pt calling about vaginal itching and burning for the last week and looking for an appt this week. Advised pt that there are no openings this week but she can go to a  urgent care facility to get treated. Last AE 1/11/21  Last depo 6/21/21    Pt also c/o spotting while on depo since may. States that spotting is every other week, super light, no pelvic pain. Did you want to schedule pt for an appt w/ US or w/o US ?  Please advise

## 2021-08-05 ENCOUNTER — OFFICE VISIT (OUTPATIENT)
Dept: OBGYN CLINIC | Age: 46
End: 2021-08-05
Payer: COMMERCIAL

## 2021-08-05 VITALS — SYSTOLIC BLOOD PRESSURE: 110 MMHG | DIASTOLIC BLOOD PRESSURE: 72 MMHG | WEIGHT: 195 LBS | BODY MASS INDEX: 31.47 KG/M2

## 2021-08-05 DIAGNOSIS — N89.8 VAGINA ITCHING: Primary | ICD-10-CM

## 2021-08-05 DIAGNOSIS — N92.0 SPOTTING: ICD-10-CM

## 2021-08-05 DIAGNOSIS — N89.8 VAGINAL IRRITATION: ICD-10-CM

## 2021-08-05 DIAGNOSIS — N94.9 VAGINAL BURNING: ICD-10-CM

## 2021-08-05 PROCEDURE — 99212 OFFICE O/P EST SF 10 MIN: CPT | Performed by: OBSTETRICS & GYNECOLOGY

## 2021-08-05 RX ORDER — CALCIUM ACETATE 667 MG/1
CAPSULE ORAL
COMMUNITY

## 2021-08-05 NOTE — PROGRESS NOTES
[facility]  [email or web address]  [phone]    Jacquelyn Ramachandran MD, FACOG       OB/GYN Problem visit    Chief Complaint:   Chief Complaint   Patient presents with    Vaginitis       Last or next WWE is: 2021    History of Present Illness: This is a new problem being evaluated by this provider. The patient is a 42 y.o.  female who reports having vaginal irritation last weekend. No sx today. She reports the symptoms are has improved. Aggravating factors include none. Alleviating factors include feminine cortisone cream.      Last weekend she felt vaginal irritation/itching/burning. She tried feminine cortisone creams, she didn't use it  & she felt better on Monday. Denies new sexual partners. She does not have other concerns. She has been on Depo ~4 years & recently she has noticed some spotting that that been coming more frequent. She has never had spotting before, but the last half year she has been noticing pink/dark brown spotting ~1 day. She has lost weight so she is not sure if it is just her hormones getting adjusted. LMP: No LMP recorded. Patient has had an injection.     PFSH:  Past Medical History:   Diagnosis Date    History of mammography, screening 19,10/19/2018; 5/10/21    neg; normal, dense    Pap smear for cervical cancer screening 2017; 23    Negative, HPV negative; Neg pap and neg HPV    Thyroid activity decreased      Past Surgical History:   Procedure Laterality Date    HX OTHER SURGICAL      D&C x 3    HX OTHER SURGICAL      Tonsillectomy as child    [place]  DELIVERY ONLY       Family History   Problem Relation Age of Onset    Cancer Father         prostate    High Cholesterol Father     Cancer Sister         leukemia as child, breast -brca carrier    Diabetes Sister     Cancer Mother         Skin    Cancer Paternal Grandfather         Skin    Stroke Paternal Grandfather      Social History Tobacco Use    Smoking status: Never Smoker    Smokeless tobacco: Never Used   Substance Use Topics    Alcohol use: No    Drug use: No     Allergies   Allergen Reactions    Penicillins Rash     Current Outpatient Medications   Medication Sig    calcium acetate,phosphat bind, (PHOSLO) 667 mg cap Take  by mouth three (3) times daily (with meals).  medroxyPROGESTERone (DEPO-PROVERA) 150 mg/mL syrg INJECT 1 ML BY INTRAMUSCULAR ROUTE ONCE FOR 1 DOSE.  cholecalciferol (VITAMIN D3) 1,000 unit tablet Take  by mouth daily.  PARoxetine (PAXIL) 10 mg tablet TAKE 1 TABLET BY MOUTH AT BEDTIME    calcium carbonate (TUMS) 200 mg calcium (500 mg) chew Take 1 Tab by mouth as needed. Indications: HEARTBURN    fexofenadine (ALLEGRA) 180 mg tablet Take 180 mg by mouth daily. (Patient not taking: Reported on 8/5/2021)     No current facility-administered medications for this visit. Review of Systems:  History obtained from the patient  Constitutional: negative for fevers, chills and weight loss  ENT ROS: negative for - hearing change, oral lesions or visual changes  Respiratory: negative for cough, wheezing or dyspnea on exertion  Cardiovascular: negative for chest pain, irregular heart beats, exertional chest pressure/discomfort  Gastrointestinal: negative for dysphagia, nausea and vomiting  Genito-Urinary ROS:  see HPI  Inteument/breast: negative for rash, breast lump and nipple discharge  Musculoskeletal:negative for stiff joints, neck pain and muscle weakness  Endocrine ROS: negative for - breast changes, galactorrhea or temperature intolerance  Hematological and Lymphatic ROS: negative for - blood clots, bruising or swollen lymph nodes    Physical Exam:  Visit Vitals  /72   Wt 195 lb (88.5 kg)   BMI 31.47 kg/m²       GENERAL: alert, well appearing, and in no distress  HEAD: normocephalic, atraumatic.    ABDOMEN: soft, nontender, nondistended, no masses or organomegaly   EGBUS: no lesions, no inflammation, no masses, mild erythema at introitus six oclock  VULVA: normal appearing vulva with no masses, tenderness or lesions  VAGINA: normal appearing vagina with normal color, no lesions, white and thin discharge  CERVIX: normal appearing cervix without discharge or lesions, non tender  UTERUS: uterus is normal size, shape, consistency and nontender   ADNEXA: normal adnexa in size, nontender and no masses  NEURO: alert, oriented, normal speech    Assessment:  No diagnosis found. Plan:  The patient is advised that she should contact the office if she does not note improvement or if symptoms recur  Recommend follow up with PCP for non-gynecologic complaints and chronic medical problems. She should contact our office with any questions or concerns  She could keep her routine annual exam appointment. We discussed potential causes of vaginal discharge/irritation. We discussed good vulvar hygiene. Recommended avoid vaginal irritants. Discussed use of mild soaps/detergents. Follow up if NI. Patient will be notified about swab results and prescription sent, if indicated. Disc typical bleeding on depo: rec fu and us because of ho myoma and age if persistent  We discussed typical perimenopausal bleeding patterns and symptoms. I recommend that she notify MD for chaotic or symptomatic bleeding, or bleeding in between menses or intermenstrual bleeding for additional evaluation. She can also schedule a consult to discuss management of symptoms of perimenopause that are concerning her or interfering with her life or day to day function or activity. On this date, 8/5/2021,  I have spent 15 minutes reviewing previous notes, test results and face to face with the patient discussing the diagnosis and importance of compliance with the treatment plan as well as documenting on the day of the visit. No orders of the defined types were placed in this encounter.       No results found for this visit on 08/05/21.

## 2021-08-08 LAB
A VAGINAE DNA VAG QL NAA+PROBE: NORMAL SCORE
BVAB2 DNA VAG QL NAA+PROBE: NORMAL SCORE
C ALBICANS DNA VAG QL NAA+PROBE: NEGATIVE
C GLABRATA DNA VAG QL NAA+PROBE: NEGATIVE
C TRACH DNA VAG QL NAA+PROBE: NEGATIVE
MEGA1 DNA VAG QL NAA+PROBE: NORMAL SCORE
N GONORRHOEA DNA VAG QL NAA+PROBE: NEGATIVE
T VAGINALIS DNA VAG QL NAA+PROBE: NEGATIVE

## 2021-08-08 NOTE — PROGRESS NOTES
The results are normal, no clear evidence of vaginal infection  [facility] message sent if active. Recommend f/u if still having symptoms/problems or has additional concerns.

## 2021-08-17 ENCOUNTER — HOSPITAL ENCOUNTER (OUTPATIENT)
Dept: MRI IMAGING | Age: 46
Discharge: HOME | End: 2021-08-17
Attending: NURSE PRACTITIONER
Payer: COMMERCIAL

## 2021-08-17 DIAGNOSIS — Z91.89 AT HIGH RISK FOR BREAST CANCER: ICD-10-CM

## 2021-08-17 PROCEDURE — A9585 GADOBUTROL INJECTION: HCPCS | Performed by: NURSE PRACTITIONER

## 2021-08-17 PROCEDURE — 77049 MRI BREAST C-+ W/CAD BI: CPT

## 2021-08-17 PROCEDURE — 74011250636 HC RX REV CODE- 250/636: Performed by: NURSE PRACTITIONER

## 2021-08-17 RX ADMIN — GADOBUTROL 7 ML: 604.72 INJECTION INTRAVENOUS at 15:09

## 2021-08-19 ENCOUNTER — PATIENT MESSAGE (OUTPATIENT)
Dept: SURGERY | Age: 46
End: 2021-08-19

## 2021-09-09 ENCOUNTER — CLINICAL SUPPORT (OUTPATIENT)
Dept: OBGYN CLINIC | Age: 46
End: 2021-09-09
Payer: COMMERCIAL

## 2021-09-09 DIAGNOSIS — Z30.42 ENCOUNTER FOR DEPO-PROVERA CONTRACEPTION: Primary | ICD-10-CM

## 2021-09-09 PROCEDURE — 96372 THER/PROPH/DIAG INJ SC/IM: CPT | Performed by: OBSTETRICS & GYNECOLOGY

## 2021-09-09 RX ORDER — MEDROXYPROGESTERONE ACETATE 150 MG/ML
150 INJECTION, SUSPENSION INTRAMUSCULAR ONCE
Status: COMPLETED | OUTPATIENT
Start: 2021-09-09 | End: 2021-09-09

## 2021-09-09 RX ADMIN — MEDROXYPROGESTERONE ACETATE 150 MG: 150 INJECTION, SUSPENSION INTRAMUSCULAR at 10:19

## 2021-09-09 NOTE — PATIENT INSTRUCTIONS
Learning About Birth Control: The Shot  What is the shot? The shot is used to prevent pregnancy. You get the shot in your upper arm or rear end (buttocks). The shot gives you a dose of the hormone progestin. The shot is often called by its brand name, Depo-Provera. Progestin prevents pregnancy in these ways: It thickens the mucus in the cervix. This makes it hard for sperm to travel into the uterus. It also thins the lining of the uterus, which makes it harder for a fertilized egg to attach to the uterus. Progestin can sometimes stop the ovaries from releasing an egg each month (ovulation). The shot provides birth control for 3 months at a time. You then need another shot. The shot may cause bone loss. Talk to your doctor about the risks and benefits. How well does it work? In the first year of use:  · When the shot is used exactly as directed, fewer than 1 woman out of 100 has an unplanned pregnancy. · When the shot is not used exactly as directed, 6 women out of 100 have an unplanned pregnancy. Be sure to tell your doctor about any health problems you have or medicines you take. He or she can help you choose the birth control method that is right for you. What are the advantages of the shot? · The shot is one of the most effective methods of birth control. · It's convenient. You need to get a shot only once every 3 months to prevent pregnancy. You don't have to interrupt sex to protect against pregnancy. · It prevents pregnancy for 3 months at a time. You don't have to worry about birth control for this time. · It's safe to use while breastfeeding. · The shot may reduce heavy bleeding and cramping. · The shot doesn't contain estrogen. So you can use it if you don't want to take estrogen or can't take estrogen because you have certain health problems or concerns. What are the disadvantages of the shot?   · The shot doesn't protect against sexually transmitted infections (STIs), such as herpes or HIV/AIDS. If you aren't sure if your sex partner might have an STI, use a condom to protect against disease. · The shot may cause bone loss in some women. Talk to your doctor about the risks and benefits. · The shot is needed every 3 months. Any side effects may last 3 months or longer. ? The shot may cause irregular periods, or you may have spotting between periods. You may also stop getting a period. Some women see having no period as an advantage. ? It may cause mood changes, less interest in sex, or weight gain. · If you want to get pregnant, it may take up to 18 months after you stop getting the shot. This is because the hormones the shot provided have to leave your system, and your body has to readjust.  Where can you learn more? Go to http://www.gray.com/  Enter Y963 in the search box to learn more about \"Learning About Birth Control: The Shot. \"  Current as of: October 8, 2020               Content Version: 12.8  © 2006-2021 Healthwise, [facility]. Care instructions adapted under license by [employer] (which disclaims liability or warranty for this information). If you have questions about a medical condition or this instruction, always ask your healthcare professional. [address] any warranty or liability for your use of this information.

## 2021-09-09 NOTE — PROGRESS NOTES
After obtaining consent, and per orders of Dr. Aman Wells, injection of Depo given by Ellen Allen MA. Patient instructed to remain in clinic for 20 minutes afterwards, and to report any adverse reaction to me immediately.     Medroxyprogesterone  : Ping Garcias  Site: Left Glute  Route: Intramuscular  Dose: 150mg/mL  Lot#: [id number]  Exp date: 3/31/25  NDC: [address]-4    Next depo inj due between: Nov. 25-Dec. 9

## 2021-12-06 ENCOUNTER — CLINICAL SUPPORT (OUTPATIENT)
Dept: OBGYN CLINIC | Age: 46
End: 2021-12-06
Payer: COMMERCIAL

## 2021-12-06 VITALS
BODY MASS INDEX: 31.09 KG/M2 | DIASTOLIC BLOOD PRESSURE: 81 MMHG | WEIGHT: 192.6 LBS | SYSTOLIC BLOOD PRESSURE: 129 MMHG | HEART RATE: 91 BPM

## 2021-12-06 DIAGNOSIS — Z76.89 ENCOUNTER FOR MENSTRUAL REGULATION: ICD-10-CM

## 2021-12-06 DIAGNOSIS — Z30.42 ENCOUNTER FOR DEPO-PROVERA CONTRACEPTION: Primary | ICD-10-CM

## 2021-12-06 PROCEDURE — 96372 THER/PROPH/DIAG INJ SC/IM: CPT | Performed by: OBSTETRICS & GYNECOLOGY

## 2021-12-06 RX ORDER — MEDROXYPROGESTERONE ACETATE 150 MG/ML
150 INJECTION, SUSPENSION INTRAMUSCULAR ONCE
Status: COMPLETED | OUTPATIENT
Start: 2021-12-06 | End: 2021-12-06

## 2021-12-06 RX ORDER — MEDROXYPROGESTERONE ACETATE 150 MG/ML
150 INJECTION, SUSPENSION INTRAMUSCULAR ONCE
Status: DISCONTINUED | OUTPATIENT
Start: 2021-12-06 | End: 2021-12-06

## 2021-12-06 RX ADMIN — MEDROXYPROGESTERONE ACETATE 150 MG: 150 INJECTION, SUSPENSION INTRAMUSCULAR at 10:39

## 2021-12-06 NOTE — PROGRESS NOTES
Last Depo-Provera injection: 9/9/2021   Side Effects if any: none and spotting  Serum HCG indicated? Depo-Provera 150 mg IM given by: Donnalee Moritz in gluteus ( right). Next Depo-Provera injection due: 2/21/22-3/7/22    Administered 150mg/mL Depo-Provera per orders of Dr. Bolivar Goldstein. Verbal consent received by Ms. Sekou Chan & injection given. Patient tolerated well, no complications and no side effects. Encouraged her to remain in clinic for 15 minutes and immediately report any adverse reactions. Patient informed of next injection date ranges and encouraged to schedule. She verbalized understanding and expressed intent to comply.

## 2021-12-06 NOTE — Clinical Note
Can you please sign encounter, the correct medication was admiistered & documented. There is an outstanding depo order which is not letting me sign the encounter. Thank you!   Hellen :)

## 2021-12-06 NOTE — Clinical Note
Good Morning! Can you please close this encounter, everything is documented, I just accidentally ordered the Depo being non-prefilled & re-ordered it being prefilled, but it won't let me close the note with my credentials. Since you are work in, can you please close this encounter.  Thank you Dr. Emily Tejeda!    Blake Grider CMA

## 2021-12-06 NOTE — PATIENT INSTRUCTIONS
Learning About Birth Control: The Shot  What is the shot? The shot is used to prevent pregnancy. You get the shot in your upper arm or rear end (buttocks). The shot gives you a dose of the hormone progestin. The shot is often called by its brand name, Depo-Provera. Progestin prevents pregnancy in these ways: It thickens the mucus in the cervix. This makes it hard for sperm to travel into the uterus. It also thins the lining of the uterus, which makes it harder for a fertilized egg to attach to the uterus. Progestin can sometimes stop the ovaries from releasing an egg each month (ovulation). The shot provides birth control for 3 months at a time. You then need another shot. The shot may cause bone loss. Talk to your doctor about the risks and benefits. How well does it work? In the first year of use:  · When the shot is used exactly as directed, fewer than 1 woman out of 100 has an unplanned pregnancy. · When the shot is not used exactly as directed, 6 women out of 100 have an unplanned pregnancy. Be sure to tell your doctor about any health problems you have or medicines you take. He or she can help you choose the birth control method that is right for you. What are the advantages of the shot? · The shot is one of the most effective methods of birth control. · It's convenient. You need to get a shot only once every 3 months to prevent pregnancy. You don't have to interrupt sex to protect against pregnancy. · It prevents pregnancy for 3 months at a time. You don't have to worry about birth control for this time. · It's safe to use while breastfeeding. · The shot may reduce heavy bleeding and cramping. · The shot doesn't contain estrogen. So you can use it if you don't want to take estrogen or can't take estrogen because you have certain health problems or concerns. What are the disadvantages of the shot?   · The shot doesn't protect against sexually transmitted infections (STIs), such as herpes or HIV/AIDS. If you aren't sure if your sex partner might have an STI, use a condom to protect against disease. · The shot may cause bone loss in some women. Talk to your doctor about the risks and benefits. · The shot is needed every 3 months. Any side effects may last 3 months or longer. ? The shot may cause irregular periods, or you may have spotting between periods. You may also stop getting a period. Some women see having no period as an advantage. ? It may cause mood changes, less interest in sex, or weight gain. · If you want to get pregnant, it may take up to 18 months after you stop getting the shot. This is because the hormones the shot provided have to leave your system, and your body has to readjust.  Where can you learn more? Go to http://www.gray.com/  Enter G208 in the search box to learn more about \"Learning About Birth Control: The Shot. \"  Current as of: June 16, 2021               Content Version: 13.0  © 2006-2021 Healthwise, Incorporated. Care instructions adapted under license by [employer] (which disclaims liability or warranty for this information). If you have questions about a medical condition or this instruction, always ask your healthcare professional. [address] any warranty or liability for your use of this information.

## 2021-12-30 ENCOUNTER — OFFICE VISIT (OUTPATIENT)
Dept: OBGYN CLINIC | Age: 46
End: 2021-12-30

## 2021-12-30 VITALS
HEIGHT: 66 IN | DIASTOLIC BLOOD PRESSURE: 84 MMHG | HEART RATE: 99 BPM | WEIGHT: 196.4 LBS | BODY MASS INDEX: 31.57 KG/M2 | SYSTOLIC BLOOD PRESSURE: 132 MMHG

## 2021-12-30 DIAGNOSIS — D21.9 MYOMA: ICD-10-CM

## 2021-12-30 DIAGNOSIS — N93.9 ABNORMAL UTERINE BLEEDING: Primary | ICD-10-CM

## 2021-12-30 PROCEDURE — 99213 OFFICE O/P EST LOW 20 MIN: CPT | Performed by: OBSTETRICS & GYNECOLOGY

## 2021-12-30 NOTE — PROGRESS NOTES
[facility]  [email or web address]    Rosa Isela King MD, [facility]       OB/GYN Follow-up visit    Chief Complaint: Follow up visit  Chief Complaint   Patient presents with    Follow-up     spotting    Ultrasound   Ultrasound:  TA AND TV SCANS PERFORMED FOR BEST VISUALIZATION. UTERUS IS ANTEVERTED, NORMAL IN SIZE AND HETEROGENEOUS IN ECHOGENICITY. THERE APPEARS TO BE A FIBROID SEEN ANTERIORLY MEASURING 49 X 39 X 46MM. THIS COULD BE PRESSING  INTO THE ENDOMETRIUM. THE ENDOMETRIUM IS NOT VISUALIZED DUE TO THE FIBROID. BILATERAL OVAREIS ARE NOT SEEN DUE TO BOWEL GAS SHADOWS. THE RIGHT AND LEFT ADNEXAS APPEAR  WNL. NO FREE FLUID SEEN IN THE CDS    History of Present Illness: This is a follow up visit from 2021. She is having a follow up for red & brown spotting for the past year & hx of myoma. The spotting is a frequent, almost daily ongoing issue. Pt has been on depo for the past ~4 years. Pt does not like the idea of IUD. Pt may be interested in [address]. She feels like she can not do the activities she would like to do with her children d/t the spotting. 2021 vaginitis plus swab WNL. She reports the symptoms are is unchanged. Aggravating factors include none. Alleviating factors include none. She does not have other concerns. LMP: No LMP recorded. Patient has had an injection.     PFSH:  Past Medical History:   Diagnosis Date    History of mammography, screening 19,10/19/2018; 5/10/21    neg; normal, dense    Pap smear for cervical cancer screening 2017; 23    Negative, HPV negative; Neg pap and neg HPV    Thyroid activity decreased      Past Surgical History:   Procedure Laterality Date    HX OTHER SURGICAL      D&C x 3    HX OTHER SURGICAL      Tonsillectomy as child    [place]  DELIVERY ONLY       Family History   Problem Relation Age of Onset    Cancer Father         prostate    High Cholesterol Father     Cancer Sister leukemia as child, breast -brca carrier    Diabetes Sister     Cancer Mother         Skin    Cancer Paternal Grandfather         Skin    Stroke Paternal Grandfather      Social History     Tobacco Use    Smoking status: Never Smoker    Smokeless tobacco: Never Used   Substance Use Topics    Alcohol use: No    Drug use: No     Allergies   Allergen Reactions    Penicillins Rash     Current Outpatient Medications   Medication Sig    calcium acetate,phosphat bind, (PHOSLO) 667 mg cap Take  by mouth three (3) times daily (with meals).  medroxyPROGESTERone (DEPO-PROVERA) 150 mg/mL syrg INJECT 1 ML BY INTRAMUSCULAR ROUTE ONCE FOR 1 DOSE.  fexofenadine (ALLEGRA) 180 mg tablet Take 180 mg by mouth daily.  cholecalciferol (VITAMIN D3) 1,000 unit tablet Take  by mouth daily.  PARoxetine (PAXIL) 10 mg tablet TAKE 1 TABLET BY MOUTH AT BEDTIME    calcium carbonate (TUMS) 200 mg calcium (500 mg) chew Take 1 Tab by mouth as needed. Indications: HEARTBURN     No current facility-administered medications for this visit.        Review of Systems:  History obtained from the patient  Constitutional: negative for fevers, chills and weight loss  ENT ROS: negative for - hearing change, oral lesions or visual changes  Respiratory: negative for cough, wheezing or dyspnea on exertion  Cardiovascular: negative for chest pain, irregular heart beats, exertional chest pressure/discomfort  Gastrointestinal: negative for dysphagia, nausea and vomiting  Genito-Urinary ROS: see hpi  Inteument/breast: negative for rash, breast lump and nipple discharge  Musculoskeletal:negative for stiff joints, neck pain and muscle weakness  Endocrine ROS: negative for - breast changes, galactorrhea or temperature intolerance  Hematological and Lymphatic ROS: negative for - blood clots, bruising or swollen lymph nodes    Physical Exam:  Visit Vitals  /84 (BP 1 Location: Right arm)   Pulse 99   Ht 5' 6\" (1.676 m)   Wt 196 lb 6.4 oz (89.1 kg)   BMI 31.70 kg/m²       GENERAL: alert, well appearing, and in no distress  PULM: clear to auscultation, no wheezes, rales or rhonchi, symmetric air entry   COR: normal rate and regular rhythm, S1 and S2 normal   ABDOMEN: soft, nontender, nondistended, no masses or organomegaly   EGBUS: no lesions, no inflammation, no masses  VULVA: normal appearing vulva with no masses, tenderness or lesions  VAGINA: normal appearing vagina with normal color, no lesions, no discharge  CERVIX: normal appearing cervix without discharge or lesions, non tender  UTERUS: uterus is normal size, shape, consistency and nontender   ADNEXA: normal adnexa in size, nontender and no masses  NEURO: alert, oriented, normal speech    Assessment:  Encounter Diagnoses   Name Primary?  Abnormal uterine bleeding Yes    Myoma        Plan:  The patient is advised that she should contact the office with any questions or concerns. She should make her routine annual gynecologic appointment if needed. We discussed potential causes of symptomatic bleeding: including but not limited to hormonal, medical, infection/inflammation and structural etiologies. Disc typical course of myomas and management options: medical/observe/surgical  We discussed progesterone only and non hormonal options for contraception including but not limited to condoms, IUDs, Nexplanon, and depo provera. Disc AUB on depo:  FU SIS and endo bx    On this date, 12/30/2021,  I have spent 20 minutes reviewing previous notes, test results and face to face with the patient discussing the diagnosis and importance of compliance with the treatment plan as well as documenting on the day of the visit. No orders of the defined types were placed in this encounter. No results found for this visit on 12/30/21.     Freddi Burkitt, MD    Physician review of ultrasound performed by technician    Today's ultrasound report and images were reviewed and discussed with the patient.   Please see images and imaging report entered by technician in PACS for more detail and progress note and diagnosis entered by MD.    Sena Robbins MD

## 2022-01-11 NOTE — PATIENT INSTRUCTIONS
Well Visit, Ages 25 to 48: Care Instructions  Overview     Well visits can help you stay healthy. Your doctor has checked your overall health and may have suggested ways to take good care of yourself. Your doctor also may have recommended tests. At home, you can help prevent illness with healthy eating, regular exercise, and other steps. Follow-up care is a key part of your treatment and safety. Be sure to make and go to all appointments, and call your doctor if you are having problems. It's also a good idea to know your test results and keep a list of the medicines you take. How can you care for yourself at home? · Get screening tests that you and your doctor decide on. Screening helps find diseases before any symptoms appear. · Eat healthy foods. Choose fruits, vegetables, whole grains, protein, and low-fat dairy foods. Limit fat, especially saturated fat. Reduce salt in your diet. · Limit alcohol. If you are a man, have no more than 2 drinks a day or 14 drinks a week. If you are a woman, have no more than 1 drink a day or 7 drinks a week. · Get at least 30 minutes of physical activity on most days of the week. Walking is a good choice. You also may want to do other activities, such as running, swimming, cycling, or playing tennis or team sports. Discuss any changes in your exercise program with your doctor. · Reach and stay at a healthy weight. This will lower your risk for many problems, such as obesity, diabetes, heart disease, and high blood pressure. · Do not smoke or allow others to smoke around you. If you need help quitting, talk to your doctor about stop-smoking programs and medicines. These can increase your chances of quitting for good. · Care for your mental health. It is easy to get weighed down by worry and stress. Learn strategies to manage stress, like deep breathing and mindfulness, and stay connected with your family and community.  If you find you often feel sad or hopeless, talk with your doctor. Treatment can help. · Talk to your doctor about whether you have any risk factors for sexually transmitted infections (STIs). You can help prevent STIs if you wait to have sex with a new partner (or partners) until you've each been tested for STIs. It also helps if you use condoms (male or female condoms) and if you limit your sex partners to one person who only has sex with you. Vaccines are available for some STIs, such as HPV. · Use birth control if it's important to you to prevent pregnancy. Talk with your doctor about the choices available and what might be best for you. · If you think you may have a problem with alcohol or drug use, talk to your doctor. This includes prescription medicines (such as amphetamines and opioids) and illegal drugs (such as cocaine and methamphetamine). Your doctor can help you figure out what type of treatment is best for you. · Protect your skin from too much sun. When you're outdoors from 10 a.m. to 4 p.m., stay in the shade or cover up with clothing and a hat with a wide brim. Wear sunglasses that block UV rays. Even when it's cloudy, put broad-spectrum sunscreen (SPF 30 or higher) on any exposed skin. · See a dentist one or two times a year for checkups and to have your teeth cleaned. · Wear a seat belt in the car. When should you call for help? Watch closely for changes in your health, and be sure to contact your doctor if you have any problems or symptoms that concern you. Where can you learn more? Go to http://[email or web address].com/  Enter P072 in the search box to learn more about \"Well Visit, Ages 25 to 48: Care Instructions. \"  Current as of: February 11, 2021               Content Version: 13.0  © 1336-5757 Healthwise, Incorporated. Care instructions adapted under license by [employer] (which disclaims liability or warranty for this information).  If you have questions about a medical condition or this instruction, always ask your healthcare professional. [place] [address] any warranty or liability for your use of this information.

## 2022-01-12 ENCOUNTER — OFFICE VISIT (OUTPATIENT)
Dept: OBGYN CLINIC | Age: 47
End: 2022-01-12
Payer: COMMERCIAL

## 2022-01-12 VITALS
SYSTOLIC BLOOD PRESSURE: 101 MMHG | BODY MASS INDEX: 31.76 KG/M2 | HEART RATE: 97 BPM | DIASTOLIC BLOOD PRESSURE: 67 MMHG | WEIGHT: 196.8 LBS

## 2022-01-12 DIAGNOSIS — D21.9 MYOMA: ICD-10-CM

## 2022-01-12 DIAGNOSIS — Z76.89 ENCOUNTER FOR MENSTRUAL REGULATION: ICD-10-CM

## 2022-01-12 DIAGNOSIS — Z01.419 ENCOUNTER FOR GYNECOLOGICAL EXAMINATION (GENERAL) (ROUTINE) WITHOUT ABNORMAL FINDINGS: Primary | ICD-10-CM

## 2022-01-12 PROCEDURE — 99396 PREV VISIT EST AGE 40-64: CPT | Performed by: OBSTETRICS & GYNECOLOGY

## 2022-01-12 RX ORDER — MEDROXYPROGESTERONE ACETATE 150 MG/ML
INJECTION, SUSPENSION INTRAMUSCULAR
Qty: 3 EACH | Refills: 4
Start: 2022-01-12 | End: 2022-02-15

## 2022-01-12 NOTE — PROGRESS NOTES
[facility]  [email or web address]  [phone]    Chen Hawthorne MD, [address] [facility]       Annual Gynecologic Exam  WWE   Chief Complaint   Patient presents with    Well Woman       Josemanuel Barkley is a 42 y.o.  [address] [facility]  female who presents for an annual exam.  No LMP recorded. Patient has had an injection. .    Patient has the following concerns today: None. AUB improved. Menstrual status:  She does not report dysmenorrhea/painful menses. She does not report heavy menses. She does not report irregular bleeding. No periods with depo injection. Sexual history and Contraception:  Social History     Substance and Sexual Activity   Sexual Activity Yes    Partners: Male    Birth control/protection: Injection       She does not reports new sexual partner(s) in the last year. Preventive Medicine History:  Her most recent Pap smear result: normal was obtained in 2021    Her most recent HR HPV screen was Negative obtained in     She does not have a history of LUCINDA 2, 3 or cervical cancer. Breast health:  [facility] Results (most recent):  Results from [place] encounter on 05/10/21    [facility] 3D LLOYD W MAMMO BI SCREENING INCL CAD    Narrative  STUDY: Bilateral digital screening mammogram with 3-D tomosynthesis    INDICATION:  Screening. COMPARISON: Most recent     BREAST COMPOSITION: The breasts are heterogeneously dense, which may obscure  small masses. FINDINGS: Bilateral digital screening mammography was performed and is  interpreted in conjunction with a computer assisted detection (CAD) system. Additionally, tomosynthesis of both breasts in the CC and MLO projections was  performed. No suspicious masses or calcifications are identified. There has been  no significant change. Impression  BI-RADS 1: Negative. No mammographic evidence of malignancy.     RECOMMENDATIONS:  Next screening mammogram is recommended in one year.    The patient will be notified of these results. Last mammogram: approximate date 2021 and was normal.   Breast cancer family updated: see FH.      Past Medical History:   Diagnosis Date    History of mammography, screening 19,10/19/2018; 5/10/21    neg; normal, dense    Pap smear for cervical cancer screening 2017; 21    Negative, HPV negative; Neg pap and neg HPV    Thyroid activity decreased      OB History    Para Term  AB Living   4 3 2   1 4   SAB IAB Ectopic Molar Multiple Live Births   1       1 4      # Outcome Date GA Lbr Isaak/2nd Weight Sex Delivery Anes PTL Lv   4 Term 14 38w6d 02:50 / 00:16 8 lb 3.4 oz (3.725 kg) F VACD EPIDURAL AN N BREE   3A Term 07/23/10 38w0d   F  LO EPI N BREE   3B Term 07/23/10    M  LO EPI N BREE   2 SAB 2009           1 Para 07    F VAGINAL DELI EPI N BREE      Birth Comments: Induced for post dates      Obstetric Comments   Menarche 17, LMP , # of children 3, age of 4st delivery 36, Hysterectomy/oophorectomy No/No, Breast bx No, history of breast feeding Yes, BCP No, Hormone therapy Yes       Past Surgical History:   Procedure Laterality Date    HX OTHER SURGICAL      D&C x 3    HX OTHER SURGICAL      Tonsillectomy as child    NE  DELIVERY ONLY       Family History   Problem Relation Age of Onset   [employer] Cancer Father         prostate    High Cholesterol Father     Cancer Sister         leukemia as child, breast -brca carrier    Diabetes Sister     Cancer Mother         Skin    Cancer Paternal Grandfather         Skin    Stroke Paternal Grandfather      Social History     Socioeconomic History    Marital status:      Spouse name: Not on file    Number of children: Not on file    Years of education: Not on file    Highest education level: Not on file   Occupational History    Not on file   Tobacco Use    Smoking status: Never Smoker    Smokeless tobacco: Never Used Substance and Sexual Activity    Alcohol use: No    Drug use: No    Sexual activity: Yes     Partners: Male     Birth control/protection: Injection   Other Topics Concern    Not on file   Social History Narrative    Not on file     Social Determinants of Health     Financial Resource Strain:     Difficulty of Paying Living Expenses: Not on file   Food Insecurity:     Worried About Running Out of Food in the Last Year: Not on file    Aleksandr of Food in the Last Year: Not on file   Transportation Needs:     Lack of Transportation (Medical): Not on file    Lack of Transportation (Non-Medical): Not on file   Physical Activity:     Days of Exercise per Week: Not on file    Minutes of Exercise per Session: Not on file   Stress:     Feeling of Stress : Not on file   Social Connections:     Frequency of Communication with Friends and Family: Not on file    Frequency of Social Gatherings with Friends and Family: Not on file    Attends Yarsani Services: Not on file    Active Member of [address] [place] [address] or Organizations: Not on file    Attends Club or Organization Meetings: Not on file    Marital Status: Not on file   Intimate Partner Violence:     Fear of Current or Ex-Partner: Not on file    Emotionally Abused: Not on file    Physically Abused: Not on file    Sexually Abused: Not on file   Housing Stability:     Unable to Pay for Housing in the Last Year: Not on file    Number of [place] in the Last Year: Not on file    Unstable Housing in the Last Year: Not on file       Allergies   Allergen Reactions    Penicillins Rash       Current Outpatient Medications   Medication Sig    medroxyPROGESTERone (DEPO-PROVERA) 150 mg/mL syrg INJECT 1 ML BY INTRAMUSCULAR ROUTE ONCE FOR 1 DOSE.  calcium acetate,phosphat bind, (PHOSLO) 667 mg cap Take  by mouth three (3) times daily (with meals).  fexofenadine (ALLEGRA) 180 mg tablet Take 180 mg by mouth daily.     cholecalciferol (VITAMIN D3) 1,000 unit tablet Take  by mouth daily.  PARoxetine (PAXIL) 10 mg tablet TAKE 1 TABLET BY MOUTH AT BEDTIME    calcium carbonate (TUMS) 200 mg calcium (500 mg) chew Take 1 Tab by mouth as needed. Indications: HEARTBURN     No current facility-administered medications for this visit.        Patient Active Problem List   Diagnosis Code    Unspecified failed trial of labor, antepartum O66.40    Myoma D21.9       Review of Systems - History obtained from the patient  Constitutional/general, HEENT, CV, Resp, GI, MSK, Neuro, Psych, Heme/lymph, Skin, Breast ROS: no significant complaints except as noted on HPI     Physical Exam  Visit Vitals  /67 (BP 1 Location: Right arm, BP Patient Position: Sitting, BP Cuff Size: Adult)   Pulse 97   Wt 196 lb 12.8 oz (89.3 kg)   BMI 31.76 kg/m²       Constitutional  · Appearance: well-nourished, well developed, alert, in no acute distress    HENT  · Head and Face: appears normal    Neck  · Inspection/Palpation: normal appearance, no masses or tenderness  · Lymph Nodes: no lymphadenopathy present  · Thyroid: gland size normal, nontender, no nodules or masses present on palpation    Chest  · Respiratory Effort: breathing unlabored  · Auscultation: normal breath sounds    Cardiovascular  · Heart:  · Auscultation: regular rate and rhythm without murmur    Breasts  · Inspection of Breasts: breasts symmetrical, no skin changes, no discharge present, nipple appearance normal, no skin retraction present  · Palpation of Breasts and Axillae: no masses present on palpation, no breast tenderness  · Axillary Lymph Nodes: no lymphadenopathy present    Gastrointestinal  · Abdominal Examination: abdomen non-tender to palpation, normal bowel sounds, no masses present  · Liver and spleen: no hepatomegaly present, spleen not palpable  · Hernias: no hernias identified    Genitourinary  · External Genitalia: normal appearance for age, no discharge present, no tenderness present, no inflammatory lesions present, no masses present, without atrophy present  · Vagina: normal vaginal vault without central or paravaginal defects, no discharge present, no inflammatory lesions present, no masses present  · Bladder: non-tender to palpation  · Urethra: appears normal  · Cervix: normal   · Uterus: normal size, shape and consistency  · Adnexa: no adnexal tenderness present, no adnexal masses present  · Perineum: perineum within normal limits, no evidence of trauma, no rashes or skin lesions present  · Anus: anus within normal limits, no hemorrhoids present  · Inguinal Lymph Nodes: no lymphadenopathy present    Skin  · General Inspection: no rash, no lesions identified    Neurologic/Psychiatric  · Mental Status:  · Orientation: grossly oriented to person, place and time  · Mood and Affect: mood normal, affect appropriate    Assessment:  42 y.o.  for well woman exam  Encounter Diagnoses   Name Primary?  Encounter for gynecological examination (general) (routine) without abnormal findings Yes    Encounter for menstrual regulation     Myoma        Plan:  The patient was counseled about healthy lifestyle, disease prevention, and bone protection. We discussed current self breast exam and mammogram recommendations  We discussed current pap smear and HR HPV testing guidelines  I recommended follow up in one year for routine annual gynecologic exam or sooner if needed  I recommended follow up with a primary care physician for chronic medical problems and evaluation of non-gynecologic concerns and to please contact our office with any GYN questions or concerns. We discussed progesterone only and non hormonal options for contraception including but not limited to condoms, IUDs, Nexplanon, and depo provera. Keep planned fu for AUB: sis possible endometrial biopsy  We discussed potential causes of symptomatic bleeding: including but not limited to hormonal, medical, infection/inflammation and structural etiologies.   We discussed options for managing symptoms including but not limited to observation, NSAIDS, hormonal management, IUDs, ablation, and hysterectomy. Await additional evaluation. Refill depo provera for now. Folllow up:  [x] return for annual well woman exam in one year or sooner if she is having problems  [] follow up and ultrasound  [x] mammogram  [] 6 months  [] 3 months  [] 1 month    Orders Placed This Encounter    medroxyPROGESTERone (DEPO-PROVERA) 150 mg/mL syrg       No results found for any visits on 01/12/22.

## 2022-02-01 DIAGNOSIS — Z12.31 ENCOUNTER FOR SCREENING MAMMOGRAM FOR BREAST CANCER: Primary | ICD-10-CM

## 2022-02-15 ENCOUNTER — TELEPHONE (OUTPATIENT)
Dept: OBGYN CLINIC | Age: 47
End: 2022-02-15

## 2022-02-15 RX ORDER — MEDROXYPROGESTERONE ACETATE 150 MG/ML
INJECTION, SUSPENSION INTRAMUSCULAR
Qty: 3 EACH | Refills: 4 | Status: SHIPPED | OUTPATIENT
Start: 2022-02-15

## 2022-02-15 NOTE — TELEPHONE ENCOUNTER
Message left at 2:45PM      42year old patient last seen in the office on 1/12/2022    Patient left a message about her prescription not being at the pharmacy  This nurse resent the prescription as it was sent no print, per MD order to patient confirmed pharmacy    Patient advised and verbalized understanding.

## 2022-02-22 ENCOUNTER — TELEPHONE (OUTPATIENT)
Dept: OBGYN CLINIC | Age: 47
End: 2022-02-22

## 2022-02-28 ENCOUNTER — CLINICAL SUPPORT (OUTPATIENT)
Dept: OBGYN CLINIC | Age: 47
End: 2022-02-28
Payer: COMMERCIAL

## 2022-02-28 VITALS
HEIGHT: 66 IN | HEART RATE: 105 BPM | SYSTOLIC BLOOD PRESSURE: 133 MMHG | DIASTOLIC BLOOD PRESSURE: 84 MMHG | BODY MASS INDEX: 32.98 KG/M2 | WEIGHT: 205.2 LBS

## 2022-02-28 DIAGNOSIS — Z30.42 ENCOUNTER FOR MANAGEMENT AND INJECTION OF DEPO-PROVERA: ICD-10-CM

## 2022-02-28 DIAGNOSIS — Z76.89 ENCOUNTER FOR MENSTRUAL REGULATION: Primary | ICD-10-CM

## 2022-02-28 PROCEDURE — 96372 THER/PROPH/DIAG INJ SC/IM: CPT | Performed by: OBSTETRICS & GYNECOLOGY

## 2022-02-28 RX ORDER — MEDROXYPROGESTERONE ACETATE 150 MG/ML
150 INJECTION, SUSPENSION INTRAMUSCULAR ONCE
Status: COMPLETED | OUTPATIENT
Start: 2022-02-28 | End: 2022-02-28

## 2022-02-28 RX ADMIN — MEDROXYPROGESTERONE ACETATE 150 MG: 150 INJECTION, SUSPENSION INTRAMUSCULAR at 11:28

## 2022-02-28 NOTE — PROGRESS NOTES
Last Depo-Provera injection: 1/12/2022   Side Effects if any: none  Serum HCG indicated? N/a within dates  Depo-Provera 150 mg IM given by: Chan Gregory in gluteus ( left). Next Depo-Provera injection due: 5/16-5/20/22    Administered 150mg/mL Depo-Provera per orders of Dr. Samy Downs. Verbal consent received by Ms. James Smith & injection given. Patient tolerated well, no complications and no side effects. Encouraged her to remain in clinic for 15 minutes and immediately report any adverse reactions. Patient informed of next injection date ranges and encouraged to schedule. She verbalized understanding and expressed intent to comply.

## 2022-02-28 NOTE — PATIENT INSTRUCTIONS
Learning About Birth Control: The Shot  What is the shot? The shot is used to prevent pregnancy. You get the shot in your upper arm or rear end (buttocks). The shot gives you a dose of the hormone progestin. The shot is often called by its brand name, Depo-Provera. Progestin prevents pregnancy in these ways: It thickens the mucus in the cervix. This makes it hard for sperm to travel into the uterus. It also thins the lining of the uterus, which makes it harder for a fertilized egg to attach to the uterus. Progestin can sometimes stop the ovaries from releasing an egg each month (ovulation). The shot provides birth control for 3 months at a time. You then need another shot. The shot may cause bone loss. Talk to your doctor about the risks and benefits. How well does it work? In the first year of use:  · When the shot is used exactly as directed, fewer than 1 woman out of 100 has an unplanned pregnancy. · When the shot is not used exactly as directed, 6 women out of 100 have an unplanned pregnancy. Be sure to tell your doctor about any health problems you have or medicines you take. He or she can help you choose the birth control method that is right for you. What are the advantages of the shot? · The shot is one of the most effective methods of birth control. · It's convenient. You need to get a shot only once every 3 months to prevent pregnancy. You don't have to interrupt sex to protect against pregnancy. · It prevents pregnancy for 3 months at a time. You don't have to worry about birth control for this time. · It's safe to use while breastfeeding. · The shot may reduce heavy bleeding and cramping. · The shot doesn't contain estrogen. So you can use it if you don't want to take estrogen or can't take estrogen because you have certain health problems or concerns. What are the disadvantages of the shot?   · The shot doesn't protect against sexually transmitted infections (STIs), such as herpes or HIV/AIDS. If you aren't sure if your sex partner might have an STI, use a condom to protect against disease. · The shot may cause bone loss in some women. Talk to your doctor about the risks and benefits. · The shot is needed every 3 months. Any side effects may last 3 months or longer. ? The shot may cause irregular periods, or you may have spotting between periods. You may also stop getting a period. Some women see having no period as an advantage. ? It may cause mood changes, less interest in sex, or weight gain. · If you want to get pregnant, it may take up to 18 months after you stop getting the shot. This is because the hormones the shot provided have to leave your system, and your body has to readjust.  Where can you learn more? Go to http://www.[employer].com/  Enter M710 in the search box to learn more about \"Learning About Birth Control: The Shot. \"  Current as of: June 16, 2021               Content Version: 13.0  © 2006-2021 Healthwise, Incorporated. Care instructions adapted under license by [employer] (which disclaims liability or warranty for this information). If you have questions about a medical condition or this instruction, always ask your healthcare professional. [address] any warranty or liability for your use of this information.

## 2022-03-07 ENCOUNTER — PATIENT MESSAGE (OUTPATIENT)
Dept: OBGYN CLINIC | Age: 47
End: 2022-03-07

## 2022-03-08 NOTE — PROGRESS NOTES
[facility]  [email or web address]  [phone]    Krissy Ramos MD, FACOG       OB/GYN Problem visit    Chief Complaint:   Chief Complaint   Patient presents with    Ultrasound    Follow-up    Endometrial Biopsy       History of Present Illness: This is not a new problem being evaluated by this provider. R/s from 22. First evaluated for this on . On 21, pt reported red & brown spotting for the past year & hx of myoma. The spotting is a frequent, almost daily ongoing issue. Pt has been on depo for the past ~4 years. Last spotting was a few weeks ago. The patient is a 42 y.o.  female . She reports the symptoms are has improved. Aggravating factors include none. Alleviating factors include none. She does not have other concerns. Last depo injected 2022    LMP: No LMP recorded. Patient has had an injection.     PFSH:  Past Medical History:   Diagnosis Date    History of mammography, screening 19,10/19/2018; 5/10/21    neg; normal, dense    Pap smear for cervical cancer screening 2017; 21    Negative, HPV negative; Neg pap and neg HPV    Thyroid activity decreased      Past Surgical History:   Procedure Laterality Date    HX OTHER SURGICAL      D&C x 3    HX OTHER SURGICAL      Tonsillectomy as child    [place]  DELIVERY ONLY       Family History   Problem Relation Age of Onset    Cancer Father         prostate    High Cholesterol Father     Cancer Sister         leukemia as child, breast -brca carrier    Diabetes Sister     Cancer Mother         Skin    Cancer Paternal Grandfather         Skin    Stroke Paternal Grandfather      Social History     Tobacco Use    Smoking status: Never Smoker    Smokeless tobacco: Never Used   Substance Use Topics    Alcohol use: No    Drug use: No     Allergies   Allergen Reactions    Doxycycline Diarrhea     GI intolerance    Penicillins Rash     Current Outpatient Medications   Medication Sig    calcium-cholecalciferol, D3, (Calcium 600 + D) tablet 1 tablet    medroxyPROGESTERone (DEPO-PROVERA) 150 mg/mL syrg INJECT 1 ML BY INTRAMUSCULAR ROUTE ONCE FOR 1 DOSE.  calcium acetate,phosphat bind, (PHOSLO) 667 mg cap Take  by mouth three (3) times daily (with meals).  fexofenadine (ALLEGRA) 180 mg tablet Take 180 mg by mouth daily.  cholecalciferol (VITAMIN D3) 1,000 unit tablet Take  by mouth daily.  PARoxetine (PAXIL) 10 mg tablet TAKE 1 TABLET BY MOUTH AT BEDTIME    calcium carbonate (TUMS) 200 mg calcium (500 mg) chew Take 1 Tab by mouth as needed. Indications: HEARTBURN     No current facility-administered medications for this visit. Review of Systems:  History obtained from the patient  Constitutional: negative for fevers, chills and weight loss  ENT ROS: negative for - hearing change, oral lesions or visual changes  Respiratory: negative for cough, wheezing or dyspnea on exertion  Cardiovascular: negative for chest pain, irregular heart beats, exertional chest pressure/discomfort  Gastrointestinal: negative for dysphagia, nausea and vomiting  Genito-Urinary ROS:  see HPI  Inteument/breast: negative for rash, breast lump and nipple discharge  Musculoskeletal:negative for stiff joints, neck pain and muscle weakness  Endocrine ROS: negative for - breast changes, galactorrhea or temperature intolerance  Hematological and Lymphatic ROS: negative for - blood clots, bruising or swollen lymph nodes    Physical Exam:  Visit Vitals  /72   Ht 5' 6\" (1.676 m)   Wt 206 lb 3.2 oz (93.5 kg)   BMI 33.28 kg/m²       GENERAL: alert, well appearing, and in no distress  HEAD: normocephalic, atraumatic.    ABDOMEN: soft, nontender, nondistended, no masses or organomegaly   EGBUS: no lesions, no inflammation, no masses  VULVA: normal appearing vulva with no masses, tenderness or lesions  VAGINA: normal appearing vagina with normal color, no lesions, no significant discharge  CERVIX: normal appearing cervix without discharge or lesions, non tender  UTERUS: uterus is normal size, shape, consistency and nontender   ADNEXA: normal adnexa in size, nontender and no masses  NEURO: alert, oriented, normal speech    Assessment:  Encounter Diagnoses   Name Primary?  Abnormal uterine bleeding (AUB) Yes    Pre-procedural laboratory examinations     Myoma        Plan:  The patient is advised that she should contact the office if she does not note improvement or if symptoms recur  Recommend follow up with PCP for non-gynecologic complaints and chronic medical problems. She should contact our office with any questions or concerns  She could keep her routine annual exam appointment. Recommend dietary calcium 1200mg per day, vitamin D 400-800 international units per day and daily weight bearing exercise 30 minutes per day. We discussed options for managing symptoms including but not limited to observation, NSAIDS, hormonal management, IUDs, ablation, and hysterectomy. Discussed AUB on depo  We discussed safer NSAID dosing for heavy cycles. Pt was advised to take this dose with food and not to take for more than 5 days. Disc RBA including bleeding/gastric irritation. FU MD if NI to discuss other options. Consider ERT for AUB prn  Disc typical course of myomas    Physician review of ultrasound performed by technician    Today's ultrasound report and images were reviewed and discussed with the patient.   Please see images and imaging report entered by technician in PACS for more detail and progress note and diagnosis entered by MD.    Rylie Ttius MD      Orders Placed This Encounter    BIOPSY OF UTERUS LINING    AMB POC URINE PREGNANCY TEST, VISUAL COLOR COMPARISON   [place]       Results for orders placed or performed in visit on 03/09/22   AMB POC URINE PREGNANCY TEST, VISUAL COLOR COMPARISON   Result Value Ref Range    VALID INTERNAL CONTROL POC Yes     HCG urine, Ql. (POC) Negative Negative       [facility]  OFFICE PROCEDURE PROGRESS NOTE    Chart reviewed for the following:   Ajay MONACO MD, have reviewed the History, Physical and updated the Allergic reactions for [address] performed immediately prior to start of procedure:   Ajay MONACO MD, have performed the following reviews on Sean Buchanan prior to the start of the procedure:            * Patient was identified by name and date of birth   * Agreement on procedure being performed was verified  * Risks and Benefits explained to the patient  * Procedure site verified and marked as necessary  * Patient was positioned for comfort  * Consent was signed and verified     Time: 12:00pm    Date of procedure: 3/9/2022    Procedure performed by: Ajay Yoder MD    Provider assisted by:   Cielo Norris [place] Amber    Patient assisted by: self    How tolerated by patient: tolerated the procedure well with no complications    Post Procedural Pain Scale: 0 - No Hurt    Comments: none      Sonohysterography procedure (SIS)    Sean Buchanan is a ,  42 y.o. female WHITE/NON- No LMP recorded. Patient has had an injection. She presents for a sonohysterography. The indications for this procedure were reviewed with the patient. The procedure was explained in detail and all questions were answered. Procedure: The patient was placed in the lithotomy position. A graves speculum was introduced into the vagina and the cervix was visualized. The cervix was prepped with zephrin solution. A tenaculum was not placed on the anterior cervix for traction. It was not necessary to dilate the cervix. A Cook's Hysterography catheter was then introduced into the uterine cavity and the speculum was removed. Sterile sonohysterography with 3D Reconstruction was performed. The endometrial cavity was distended with sterile saline.   The findings are as follows: normal cavity without polyp lesions. The patient tolerated the procedure well without complication, and was discharged to home. US report:  TRANSVAGINAL ULTRASOUND PERFORMED  UTERUS IS ANTEVERTED, NORMAL IN SIZE AND HETEROGENEOUS IN ECHOGENICITY. AN ANTERIOR RIGHT FIBROID IS SEEN AND MEASURED.  ENDOMETRIUM MEASURES 5-6MM IN THICKNESS. NO EVIDENCE OF MASSES OR ABNORMALITIES ARE SEEN. THE SIS WAS COMPLETED. FOLLOWING INSERTION OF THE CATHETER INTO THE UTERUS, AND INJECTION OF  SALINE THE ENDOMETRIAL CAVITY DISTENDED. NO MASSES OR POLYPS WERE SEEN. RIGHT ADNEXA APPEARS WITHIN NORMAL LIMITS. LEFT OVARY APPEARS WITHIN NORMAL LIMITS. NO FREE FLUID SEEN IN THE CDS. Austin Adams MD, [facility]  [address] [address]  [address] [facility] [address]  [phone]     [facility]  OFFICE PROCEDURE PROGRESS NOTE        Chart reviewed for the following:   I, Citlali Sanchez MD, have reviewed the History, Physical and updated the Allergic reactions for [address] performed immediately prior to start of procedure:   Maci Vicente MD, have performed the following reviews on Guille Rose prior to the start of the procedure:            * Patient was identified by name and date of birth   * Agreement on procedure being performed was verified  * Risks and Benefits explained to the patient  * Procedure site verified and marked as necessary  * Patient was positioned for comfort  * Consent was signed and verified     Time: 1200pm      Date of procedure: 3/9/2022    Procedure performed by: Citlali Sanchez MD    Provider assisted by: Tin Thakur MA    Patient assisted by: self    How tolerated by patient: tolerated the procedure well with no complications    Post Procedural Pain Scale: 2 - Hurts Little Bit    Comments: none              Procedure note: Endometrial biopsy     Guille Rose is a ,  42 y. o. female WHITE/NON- No LMP recorded. Patient has had an injection. The patient has a history of  AUB  . After the indications, risks, benefits, and alternatives to performing an endometrial biopsy were explained to the patient, her questions were answered and informed consent was obtained. Patient wanted to proceed with office endometrial biopsy evaluation. Procedure: The patient was placed on the table in the dorsal lithotomy position. A bimanual exam showed the uterus to be anterior. The uterus was not enlarged. A speculum was placed in the vagina. The cervix was visualized and prepped with zephrin. A tenaculum was not placed on the anterior lip of the cervix for traction. It was was not necessary to dilate the cervix. A pipelle was passed through the endocervical canal without difficulty. The uterus was sounded to 7 cm's. A small amount of tissue was returned. This tissue was placed in formalin and sent to pathology. It was felt that an adequate sample was obtained. The patient tolerated the procedure well and she reported level of cramping as moderate. Good hemostasis was noted. All instruments were removed. Post Procedural Status: The patient was observed for 10 minutes after the procedure. She had pain level:  mild at the time of discharge. There were no complications. The patient was discharged in stable condition. The patient was given routine post endometrial biopsy instructions. She should notify MD with any questions, concerns, fever, or worsening pain. We discussed that she will be contacted with the pathology results.

## 2022-03-08 NOTE — PATIENT INSTRUCTIONS
Sonohysterogram: About This Test  What is it? Sonohysterography (say \"cit-xog-itjn-ter--[facility]-fee\") is an ultrasound test. The doctor fills the uterus with fluid. Then they use sound waves to look at the inside of the uterus. Why is it done? A sonohysterogram may be done if other tests don't show enough detail. A clearer view of the uterus can help to:  · Look for the cause of abnormal vaginal bleeding. · Look for the cause of fertility problems or repeated miscarriages. · Find problems in the uterus, such as an abnormal shape or structure. · Look for an injury, polyps, fibroids, or scars. How do you prepare for the test?  Schedule your test for when you won't be having your period. Your doctor may suggest that the test be done soon after your period ends and before your ovary releases an egg (ovulates). This timing allows your doctor to see the inside of your uterus better. It also avoids doing the test when you could be pregnant. Your doctor may have you take an over-the-counter pain medicine, such as ibuprofen, about an hour before your test. This can help with cramps and pain during the test.  You may want to bring a sanitary pad. Some of the saline solution may leak out after the test. You also may have some slight bleeding. How is the test done? · You will lie on your back on an exam table with your feet and legs supported by footrests. · The doctor will use a device called a speculum to gently open the vagina a little bit. This lets the doctor see the cervix. · A thin, flexible tube (catheter) will be put through the cervix into the uterus. Then your doctor will take the speculum out. · Your doctor will place an ultrasound wand (transducer) in your vagina. The pictures are shown on a TV screen during the test.  · Sterile saline solution will be slowly put into the uterus through the tube. How long does the test take? The test will take about 15 to 30 minutes.   What happens after the test?  · You will probably be able to go home right away. It depends on the reason for the test.  · You can go back to your usual activities right away. · Some of the saline solution may leak out of your vagina. You may also see some spots of blood. Wearing a pad can help absorb the fluid. · You may have some cramping for a couple of days after the test. You can take an over-the-counter pain medicine to relieve cramping. When should you call for help? Call your doctor now or seek immediate medical care if:  · You have a fever. · You have vaginal discharge that has increased in amount or smells bad. · You have new or worse pain in your pelvis. · You have new or worse vaginal bleeding. Follow-up care is a key part of your treatment and safety. Be sure to make and go to all appointments, and call your doctor if you are having problems. It's also a good idea to keep a list of the medicines you take. Ask your doctor when you can expect to have your test results. Where can you learn more? Go to http://www.gray.com/  Enter A332 in the search box to learn more about \"Sonohysterogram: About This Test.\"  Current as of: June 16, 2021               Content Version: 13.2  © 2006-2022 Healthwise, Incorporated. Care instructions adapted under license by [employer] (which disclaims liability or warranty for this information). If you have questions about a medical condition or this instruction, always ask your healthcare professional. [place] [address] any warranty or liability for your use of this information.

## 2022-03-09 ENCOUNTER — OFFICE VISIT (OUTPATIENT)
Dept: OBGYN CLINIC | Age: 47
End: 2022-03-09

## 2022-03-09 ENCOUNTER — HOSPITAL ENCOUNTER (OUTPATIENT)
Dept: LAB | Age: 47
Discharge: HOME | End: 2022-03-09

## 2022-03-09 VITALS
WEIGHT: 206.2 LBS | DIASTOLIC BLOOD PRESSURE: 72 MMHG | HEIGHT: 66 IN | BODY MASS INDEX: 33.14 KG/M2 | SYSTOLIC BLOOD PRESSURE: 116 MMHG

## 2022-03-09 DIAGNOSIS — N93.9 ABNORMAL UTERINE BLEEDING (AUB): Primary | ICD-10-CM

## 2022-03-09 DIAGNOSIS — D21.9 MYOMA: ICD-10-CM

## 2022-03-09 DIAGNOSIS — Z01.812 PRE-PROCEDURAL LABORATORY EXAMINATIONS: ICD-10-CM

## 2022-03-09 LAB
HCG URINE, QL. (POC): NEGATIVE
VALID INTERNAL CONTROL?: YES

## 2022-03-09 PROCEDURE — 58100 BIOPSY OF UTERUS LINING: CPT | Performed by: OBSTETRICS & GYNECOLOGY

## 2022-03-09 PROCEDURE — 99213 OFFICE O/P EST LOW 20 MIN: CPT | Performed by: OBSTETRICS & GYNECOLOGY

## 2022-03-09 PROCEDURE — 81025 URINE PREGNANCY TEST: CPT | Performed by: OBSTETRICS & GYNECOLOGY

## 2022-03-13 NOTE — PROGRESS NOTES
Pathology: benign (add to notes for [facility])  Notify pt if [employer] message not read or not active.   Update PSH, (even for office procedure) Include date of procedure  procedure name (check office note prn),

## 2022-03-19 PROBLEM — D21.9 MYOMA: Status: ACTIVE | Noted: 2021-12-30

## 2022-05-16 ENCOUNTER — OFFICE VISIT (OUTPATIENT)
Dept: OBGYN CLINIC | Age: 47
End: 2022-05-16

## 2022-05-16 VITALS
BODY MASS INDEX: 33.18 KG/M2 | WEIGHT: 205.6 LBS | SYSTOLIC BLOOD PRESSURE: 120 MMHG | HEART RATE: 74 BPM | DIASTOLIC BLOOD PRESSURE: 80 MMHG

## 2022-05-16 DIAGNOSIS — Z30.42 SURVEILLANCE FOR DEPO-PROVERA CONTRACEPTION: Primary | ICD-10-CM

## 2022-05-16 PROCEDURE — 96372 THER/PROPH/DIAG INJ SC/IM: CPT | Performed by: OBSTETRICS & GYNECOLOGY

## 2022-05-16 RX ORDER — MEDROXYPROGESTERONE ACETATE 150 MG/ML
150 INJECTION, SUSPENSION INTRAMUSCULAR ONCE
Status: COMPLETED | OUTPATIENT
Start: 2022-05-16 | End: 2022-05-16

## 2022-05-16 RX ADMIN — MEDROXYPROGESTERONE ACETATE 150 MG: 150 INJECTION, SUSPENSION INTRAMUSCULAR at 11:24

## 2022-05-16 NOTE — PATIENT INSTRUCTIONS
Learning About Birth Control: The Shot  What is the shot? The shot is used to prevent pregnancy. You get the shot in your upper arm or rear end (buttocks). The shot gives you a dose of the hormone progestin. The shot is often called by its brand name, Depo-Provera. Progestin prevents pregnancy in these ways: It thickens the mucus in the cervix. This makes it hard for sperm to travel into the uterus. It also thins the lining of the uterus, which makes it harder for a fertilized egg to attach to the uterus. Progestin can sometimes stop the ovaries from releasing an egg each month (ovulation). The shot provides birth control for 3 months at a time. You then need another shot. The shot may cause bone loss. Talk to your doctor about the risks and benefits. How well does it work? In the first year of use:  · When the shot is used exactly as directed, fewer than 1 woman out of 100 has an unplanned pregnancy. · When the shot is not used exactly as directed, 6 women out of 100 have an unplanned pregnancy. Be sure to tell your doctor about any health problems you have or medicines you take. He or she can help you choose the birth control method that is right for you. What are the advantages of the shot? · The shot is one of the most effective methods of birth control. · It's convenient. You need to get a shot only once every 3 months to prevent pregnancy. You don't have to interrupt sex to protect against pregnancy. · It prevents pregnancy for 3 months at a time. You don't have to worry about birth control for this time. · It's safe to use while breastfeeding. · The shot may reduce heavy bleeding and cramping. · The shot doesn't contain estrogen. So you can use it if you don't want to take estrogen or can't take estrogen because you have certain health problems or concerns. What are the disadvantages of the shot?   · The shot doesn't protect against sexually transmitted infections (STIs), such as herpes or HIV/AIDS. If you aren't sure if your sex partner might have an STI, use a condom to protect against disease. · The shot may cause bone loss in some women. Talk to your doctor about the risks and benefits. · The shot is needed every 3 months. Any side effects may last 3 months or longer. ? The shot may cause irregular periods, or you may have spotting between periods. You may also stop getting a period. Some women see having no period as an advantage. ? It may cause mood changes, less interest in sex, or weight gain. · If you want to get pregnant, it may take up to 18 months after you stop getting the shot. This is because the hormones the shot provided have to leave your system, and your body has to readjust.  Where can you learn more? Go to http://www.gray.com/  Enter K416 in the search box to learn more about \"Learning About Birth Control: The Shot. \"  Current as of: June 16, 2021               Content Version: 13.2  © 2006-2022 Healthwise, Incorporated. Care instructions adapted under license by [employer] (which disclaims liability or warranty for this information). If you have questions about a medical condition or this instruction, always ask your healthcare professional. [address] any warranty or liability for your use of this information.

## 2022-05-16 NOTE — PROGRESS NOTES
Last Depo-Provera injection: 2/28/22  Side Effects if any: none  Serum HCG indicated? N/a within dates  Depo-Provera 150 mg IM given by: Marga Crowder in gluteus ( right). Next Depo-Provera injection due: 8/1/22 - 8/15/22    Administered 150mg/mL Depo-Provera per orders of Dr. Hudson Mayer. Verbal consent received by Ms. Jermaine Lockett & injection given. Patient tolerated well, no complications and no side effects. Encouraged her to remain in clinic for 15 minutes and immediately report any adverse reactions. Patient informed of next injection date ranges and encouraged to schedule. She verbalized understanding and expressed intent to comply.

## 2022-10-25 ENCOUNTER — OFFICE VISIT (OUTPATIENT)
Dept: OBGYN CLINIC | Age: 47
End: 2022-10-25
Payer: COMMERCIAL

## 2022-10-25 VITALS
BODY MASS INDEX: 32.24 KG/M2 | DIASTOLIC BLOOD PRESSURE: 78 MMHG | HEIGHT: 66 IN | HEART RATE: 86 BPM | WEIGHT: 200.6 LBS | SYSTOLIC BLOOD PRESSURE: 117 MMHG

## 2022-10-25 DIAGNOSIS — Z30.42 SURVEILLANCE FOR DEPO-PROVERA CONTRACEPTION: Primary | ICD-10-CM

## 2022-10-25 DIAGNOSIS — Z76.89 ENCOUNTER FOR MENSTRUAL REGULATION: ICD-10-CM

## 2022-10-25 DIAGNOSIS — Z30.42 ENCOUNTER FOR MANAGEMENT AND INJECTION OF DEPO-PROVERA: ICD-10-CM

## 2022-10-25 PROCEDURE — 96372 THER/PROPH/DIAG INJ SC/IM: CPT | Performed by: OBSTETRICS & GYNECOLOGY

## 2022-10-25 RX ORDER — MEDROXYPROGESTERONE ACETATE 150 MG/ML
150 INJECTION, SUSPENSION INTRAMUSCULAR ONCE
Status: COMPLETED | OUTPATIENT
Start: 2022-10-25 | End: 2022-10-25

## 2022-10-25 RX ADMIN — MEDROXYPROGESTERONE ACETATE 150 MG: 150 INJECTION, SUSPENSION INTRAMUSCULAR at 10:18

## 2022-10-25 NOTE — PROGRESS NOTES
Last Depo-Provera injection: 8/9/2022   Side Effects if any: none  Serum HCG indicated? N/A  Depo-Provera 150 mg IM given by: Sonal Handler in gluteus ( right). Next Depo-Provera injection due: 1/10/23-1/24/23    Administered 150mg/mL Depo-Provera per orders of Dr. Jorge Sandra. Verbal consent received by Ms. Hugh Soriano & injection given. Patient tolerated well, no complications and no side effects. Encouraged her to remain in clinic for 15 minutes and immediately report any adverse reactions. Patient informed of next injection date ranges and encouraged to schedule. She verbalized understanding and expressed intent to comply.

## 2023-01-08 ENCOUNTER — PATIENT MESSAGE (OUTPATIENT)
Dept: OBGYN CLINIC | Age: 48
End: 2023-01-08

## 2023-01-13 ENCOUNTER — OFFICE VISIT (OUTPATIENT)
Dept: OBGYN CLINIC | Age: 48
End: 2023-01-13
Payer: COMMERCIAL

## 2023-01-13 VITALS
WEIGHT: 207.8 LBS | HEIGHT: 66 IN | SYSTOLIC BLOOD PRESSURE: 118 MMHG | HEART RATE: 103 BPM | BODY MASS INDEX: 33.4 KG/M2 | DIASTOLIC BLOOD PRESSURE: 76 MMHG

## 2023-01-13 DIAGNOSIS — Z01.419 ENCOUNTER FOR GYNECOLOGICAL EXAMINATION (GENERAL) (ROUTINE) WITHOUT ABNORMAL FINDINGS: Primary | ICD-10-CM

## 2023-01-13 DIAGNOSIS — Z76.89 ENCOUNTER FOR MENSTRUAL REGULATION: ICD-10-CM

## 2023-01-13 DIAGNOSIS — Z30.42 SURVEILLANCE FOR DEPO-PROVERA CONTRACEPTION: ICD-10-CM

## 2023-01-13 DIAGNOSIS — Z30.42 ENCOUNTER FOR MANAGEMENT AND INJECTION OF DEPO-PROVERA: ICD-10-CM

## 2023-01-13 RX ORDER — MEDROXYPROGESTERONE ACETATE 150 MG/ML
150 INJECTION, SUSPENSION INTRAMUSCULAR ONCE
Status: COMPLETED | OUTPATIENT
Start: 2023-01-13 | End: 2023-01-13

## 2023-01-13 RX ORDER — MEDROXYPROGESTERONE ACETATE 150 MG/ML
INJECTION, SUSPENSION INTRAMUSCULAR
Qty: 3 EACH | Refills: 4
Start: 2023-01-13 | End: 2023-01-13 | Stop reason: SDUPTHER

## 2023-01-13 RX ORDER — MEDROXYPROGESTERONE ACETATE 150 MG/ML
INJECTION, SUSPENSION INTRAMUSCULAR
Qty: 3 EACH | Refills: 4 | Status: SHIPPED | OUTPATIENT
Start: 2023-01-13

## 2023-01-13 RX ADMIN — MEDROXYPROGESTERONE ACETATE 150 MG: 150 INJECTION, SUSPENSION INTRAMUSCULAR at 11:07

## 2023-01-13 NOTE — PROGRESS NOTES
William Ng is a 41 y.o. female returns for an annual exam     Chief Complaint   Patient presents with    Well Woman    Depo       No LMP recorded. Patient has had an injection. Her periods are  none  in flow and minimal to none using DMPA. She does not have dysmenorrhea. Problems: no significant problems. Wondering about colonoscopy screening. Wondering about calcium & vit d (h/o given). Depo today (left glute), doing well with depo, denies AUB since SIS done 3/9/22  Birth Control: Depo-Provera injections. Last Pap: normal obtained 1 year(s) ago. She does not have a history of LUCINDA 2, 3 or cervical cancer. Last Mammogram: had a recent mammogram 5/2022 which was negative for malignancy. It was normal   Last Bone Density: never obtained  Last colonoscopy: never obtained       1. Have you been to the ER, urgent care clinic, or hospitalized since your last visit? No    2. Have you seen or consulted any other health care providers outside of the [address] [place] [address] since your last visit? No    Examination chaperoned by James Valencia MA. Last Depo-Provera injection: 10/25/22  Side Effects if any: none  Serum HCG indicated? Depo-Provera 150 mg IM given by: James Valencia MA in gluteus ( left). Next Depo-Provera injection due: 3/31/23-4/14/23    Administered 150mg/mL Depo-Provera per orders of Bing Schwab, MD . Verbal consent received by Ms. Rahat Conway & injection given. Patient tolerated well, no complications and no side effects. Encouraged her to remain in clinic for 15 minutes and immediately report any adverse reactions. Patient informed of next injection date ranges and encouraged to schedule. She verbalized understanding and expressed intent to comply.

## 2023-01-13 NOTE — PROGRESS NOTES
[facility]  [email or web address]  [phone]    Ezio Scott MD, [facility]     Annual Gynecologic Exam:  Chief Complaint   Patient presents with    Well Woman    Anupam Wilson is a ,  41 y.o. female   No LMP recorded. Patient has had an injection. She presents for her annual checkup. She is having no significant problems. Happy with depo. Per Rooming Note:     No LMP recorded. Patient has had an injection. Her periods are  none  in flow and minimal to none using DMPA. She does not have dysmenorrhea. Problems: no significant problems. Wondering about colonoscopy screening. Wondering about calcium & vit d (h/o given). Depo today (left glute), doing well with depo, denies AUB since SIS done 3/9/22  Birth Control: Depo-Provera injections. Last Pap: normal obtained 1 year(s) ago. She does not have a history of LUCINDA 2, 3 or cervical cancer. Last Mammogram: had a recent mammogram 2022 which was negative for malignancy. It was normal   Last Bone Density: never obtained  Last colonoscopy: never obtained     Sexual history and Contraception:  Social History     Substance and Sexual Activity   Sexual Activity Yes    Partners: Male    Birth control/protection: Injection       Past Medical History:   Diagnosis Date    History of mammography, screening 19,10/19/2018; 5/10/21; 22    neg; normal, dense; normal    Pap smear for cervical cancer screening 2017; 23    Negative, HPV negative; Neg pap and neg HPV    Thyroid activity decreased      Current Outpatient Medications   Medication Sig    medroxyPROGESTERone (DEPO-PROVERA) 150 mg/mL syrg INJECT 1 ML BY INTRAMUSCULAR ROUTE ONCE FOR 1 DOSE.    calcium-cholecalciferol, D3, (CALTRATE 600+D) tablet 1 tablet    cholecalciferol (VITAMIN D3) 1,000 unit tablet Take  by mouth daily.     PARoxetine (PAXIL) 10 mg tablet TAKE 1 TABLET BY MOUTH AT BEDTIME    calcium carbonate (TUMS) 200 mg calcium (500 mg) chew Take 1 Tab by mouth as needed. Indications: HEARTBURN    calcium acetate,phosphat bind, (PHOSLO) 667 mg cap Take  by mouth three (3) times daily (with meals). (Patient not taking: Reported on 2023)    fexofenadine (ALLEGRA) 180 mg tablet Take 180 mg by mouth daily.  (Patient not taking: Reported on 2023)     Current Facility-Administered Medications   Medication Dose Route Frequency    medroxyPROGESTERone (DEPO-PROVERA) 150 mg/mL injection 150 mg  150 mg IntraMUSCular ONCE     OB History    Para Term  AB Living   4 3 2   1 4   SAB IAB Ectopic Molar Multiple Live Births   1       1 4      # Outcome Date GA Lbr Isaak/2nd Weight Sex Delivery Anes PTL Lv   4 Term 14 38w6d 02:50 / 00:16 8 lb 3.4 oz (3.725 kg) F VACD EPIDURAL AN N BREE   3A Term 07/23/10 38w0d   F  LO EPI N BREE   3B Term 07/23/10    M  LO EPI N BREE   2 SAB 2009           1 Para 07    F VAGINAL DELI EPI N BREE      Birth Comments: Induced for post dates      Obstetric Comments   Menarche 17, LMP , # of children 3, age of 4st delivery 36, Hysterectomy/oophorectomy No/No, Breast bx No, history of breast feeding Yes, BCP No, Hormone therapy Yes     Past Surgical History:   Procedure Laterality Date    HX DILATION AND CURETTAGE      D&C x 3    HX GYN  2022    Endometrial Biopsy (TP)-benign    HX [place]ILLECTOMY      [place]  DELIVERY ONLY       Family History   Problem Relation Age of Onset    Cancer Father         prostate    High Cholesterol Father     Cancer Sister         leukemia as child, breast -brca carrier    Diabetes Sister     Breast Cancer Sister     Cancer Mother         Skin    Cancer Paternal Grandfather         Skin    Stroke Paternal Grandfather      Social History     Socioeconomic History    Marital status:      Spouse name: Not on file    Number of children: Not on file    Years of education: Not on file    Highest education level: Not on file   Occupational History    Not on file   Tobacco Use    Smoking status: Never    Smokeless tobacco: Never   Substance and Sexual Activity    Alcohol use: No    Drug use: No    Sexual activity: Yes     Partners: Male     Birth control/protection: Injection   Other Topics Concern    Not on file   Social History Narrative    Not on file     Social Determinants of Health     Financial Resource Strain: Not on file   Food Insecurity: Not on file   Transportation Needs: Not on file   Physical Activity: Not on file   Stress: Not on file   Social Connections: Not on file   Intimate Partner Violence: Not on file   Housing Stability: Not on file     Tobacco History:  reports that she has never smoked. She has never used smokeless tobacco.  Alcohol Abuse:  reports no history of alcohol use. Drug Abuse:  reports no history of drug use. Allergies   Allergen Reactions    Doxycycline Diarrhea     GI intolerance    Penicillins Rash       Patient Active Problem List   Diagnosis Code    Unspecified failed trial of labor, antepartum O66.40    Myoma D21.9       Review of Systems - History obtained from the patient and patient filled out questionnaire   Constitutional/general, HEENT, CV, Resp, GI, MSK, Neuro, Psych, Heme/lymph, Skin, Breast ROS: no significant complaints except as noted on HPI    Physical Exam  Visit Vitals  /76   Pulse (!) 103   Ht 5' 6\" (1.676 m)   Wt 207 lb 12.8 oz (94.3 kg)   BMI 33.54 kg/m²       Constitutional  Appearance: well-nourished, well developed, alert, in no acute distress    HENT  Head and Face: appears normal    Neck  Inspection/Palpation: normal appearance, no masses or tenderness  Lymph Nodes: no lymphadenopathy present  Thyroid: gland size normal, nontender, no nodules or masses present on palpation    Chest  Respiratory Effort: breathing unlabored  Auscultation: normal breath sounds    Cardiovascular  Heart:   Auscultation: regular rate and rhythm without murmur    Breasts  Inspection of Breasts: breasts symmetrical, no skin changes, no discharge present, nipple appearance normal, no skin retraction present  Palpation of Breasts and Axillae: no masses present on palpation, no breast tenderness  Axillary Lymph Nodes: no lymphadenopathy present    Gastrointestinal  Abdominal Examination: abdomen non-tender to palpation, normal bowel sounds, no masses present  Liver and spleen: no hepatomegaly present, spleen not palpable  Hernias: no hernias identified    Genitourinary  External Genitalia: normal appearance for age, no discharge present, no tenderness present, no inflammatory lesions present, no masses present  Vagina: normal vaginal vault without central or paravaginal defects, minimal discharge present, no inflammatory lesions present, no masses present  Bladder: non-tender to palpation  Urethra: appears normal  Cervix: normal   Uterus: normal size, shape and consistency  Adnexa: no adnexal tenderness present, no adnexal masses present  Perineum: perineum within normal limits, no evidence of trauma, no rashes or skin lesions present  Anus: anus within normal limits, no hemorrhoids present  Inguinal Lymph Nodes: no lymphadenopathy present    Skin  General Inspection: no rash, no lesions identified    Neurologic/Psychiatric  Mental Status:  Orientation: grossly oriented to person, place and time  Mood and Affect: mood normal, affect appropriate    Assessment:  41 y.o.  for well woman exam  Her current medical status is satisfactory with no evidence of significant gynecologic issues. Encounter Diagnoses   Name Primary?     Surveillance for Depo-Provera contraception Yes    Encounter for management and injection of depo-Provera     Encounter for menstrual regulation     Encounter for gynecological examination (general) (routine) without abnormal findings        Plan:  The patient was counseled about diet, exercise, healthy lifestyle  I recommend annual well woman exams  We discussed current pap smear and HR HPV testing guidelines. I recommended follow up one year for routine annual gynecologic exam or sooner prn  Handouts were given to the patient  I recommended follow up with a primary care physician for chronic medical problems and evaluation of non-gynecologic concerns and to please contact our office with any GYN questions or concerns. I recommended testing per CDC guidelines and at patient request.   Continue depo  Disc colon screening recs  Discussed calcium/vitamin D/weight bearing exercise and osteoporosis prevention: handout given        Folllow up:  [x] return for annual well woman exam in one year or sooner if she is having problems  [] follow up and ultrasound  [] 6 months  [] 3 months  [] 6 weeks   [] 1 month    Orders Placed This Encounter    medroxyPROGESTERone (DEPO-PROVERA) 150 mg/mL injection 150 mg    DISCONTD: medroxyPROGESTERone (DEPO-PROVERA) 150 mg/mL syrg    medroxyPROGESTERone (DEPO-PROVERA) 150 mg/mL syrg       No results found for any visits on 01/13/23.

## 2023-03-31 ENCOUNTER — CLINICAL SUPPORT (OUTPATIENT)
Dept: OBGYN CLINIC | Age: 48
End: 2023-03-31
Payer: COMMERCIAL

## 2023-03-31 VITALS
BODY MASS INDEX: 32.62 KG/M2 | HEART RATE: 90 BPM | DIASTOLIC BLOOD PRESSURE: 75 MMHG | SYSTOLIC BLOOD PRESSURE: 122 MMHG | WEIGHT: 203 LBS | HEIGHT: 66 IN

## 2023-03-31 DIAGNOSIS — Z30.42 ENCOUNTER FOR MANAGEMENT AND INJECTION OF DEPO-PROVERA: ICD-10-CM

## 2023-03-31 DIAGNOSIS — Z30.42 SURVEILLANCE FOR DEPO-PROVERA CONTRACEPTION: Primary | ICD-10-CM

## 2023-03-31 PROCEDURE — 96372 THER/PROPH/DIAG INJ SC/IM: CPT | Performed by: OBSTETRICS & GYNECOLOGY

## 2023-03-31 RX ORDER — MEDROXYPROGESTERONE ACETATE 150 MG/ML
150 INJECTION, SUSPENSION INTRAMUSCULAR ONCE
Status: COMPLETED | OUTPATIENT
Start: 2023-03-31 | End: 2023-03-31

## 2023-03-31 RX ADMIN — MEDROXYPROGESTERONE ACETATE 150 MG: 150 INJECTION, SUSPENSION INTRAMUSCULAR at 12:56

## 2023-03-31 NOTE — PROGRESS NOTES
DEPO injection     ~~~~~~~~~~~~~~~~~~~~~     Last depo injection: 1/13/2023  Pregnancy test? N/A   Side effects? none  Depo-Provera 150mg injection IM, given by Steve Marks in gluteus ( right). Next depo injection due:  6/16/23-6/30/23    Administered 150mg/mL Depo-Provera per orders of Cheryl Kovacs MD . Verbal consent received by Ms. Nicki Rivera & injection given. Patient tolerated well, no complications and no side effects. Encouraged her to remain in clinic for 15 minutes and immediately report any adverse reactions. Patient informed of next injection date ranges and encouraged to schedule. She verbalized understanding and expressed intent to comply.          Notes: pt is fish, snake, cat sitting for neighbors for spring break & visiting her mom for [facility]

## 2023-05-23 ENCOUNTER — TELEPHONE (OUTPATIENT)
Age: 48
End: 2023-05-23

## 2023-05-23 ENCOUNTER — PATIENT MESSAGE (OUTPATIENT)
Age: 48
End: 2023-05-23

## 2023-05-23 DIAGNOSIS — Z80.3 FAMILY HISTORY OF BREAST CANCER: Primary | ICD-10-CM

## 2023-05-23 DIAGNOSIS — R92.2 DENSE BREAST TISSUE ON MAMMOGRAM: ICD-10-CM

## 2023-05-23 NOTE — TELEPHONE ENCOUNTER
----- Message from Olga Samson MD sent at 5/22/2023  9:54 PM EDT -----  Update HM with date of mammogram.  Add \"dense\" to MMG notes. Rec breast consult with high risk breast specialist because of dense breast tissue and +FH. Pt may decline, or defer if previously had consultation for increased breast cancer risk. (note in chart after \"dense\" either declined, previously had or consult planned.)  Tickle for fu or decline of consult, otherwise place consult and notify pt.

## 2023-05-23 NOTE — TELEPHONE ENCOUNTER
LVM on mobile for pt to review her [facility] messages & call prn.  Advised Dr. Ayanna Villareal & I both sent her one     Referral placed for breast center    [facility] message sent with referral details

## 2023-05-23 NOTE — TELEPHONE ENCOUNTER
Writer spoke with patient. Patient returning phone call and advised writer that she has an appointment with Dr. Андрей White on this upcoming Friday.

## 2023-05-26 ENCOUNTER — OFFICE VISIT (OUTPATIENT)
Age: 48
End: 2023-05-26
Payer: COMMERCIAL

## 2023-05-26 VITALS
HEIGHT: 66 IN | HEART RATE: 87 BPM | WEIGHT: 203 LBS | DIASTOLIC BLOOD PRESSURE: 78 MMHG | BODY MASS INDEX: 32.62 KG/M2 | SYSTOLIC BLOOD PRESSURE: 118 MMHG

## 2023-05-26 DIAGNOSIS — N60.12 FIBROCYSTIC BREAST CHANGES OF BOTH BREASTS: Primary | ICD-10-CM

## 2023-05-26 DIAGNOSIS — Z12.31 ENCOUNTER FOR SCREENING MAMMOGRAM FOR BREAST CANCER: ICD-10-CM

## 2023-05-26 DIAGNOSIS — Z80.3 FAMILY HISTORY OF BREAST CANCER: ICD-10-CM

## 2023-05-26 DIAGNOSIS — N60.11 FIBROCYSTIC BREAST CHANGES OF BOTH BREASTS: Primary | ICD-10-CM

## 2023-05-26 PROCEDURE — 99213 OFFICE O/P EST LOW 20 MIN: CPT | Performed by: NURSE PRACTITIONER

## 2023-05-26 NOTE — PROGRESS NOTES
HISTORY OF PRESENT ILLNESS  Caroline Katz is a 41 y.o. female     HPI Established patient presents for follow-up as a high risk patient due to a family history of breast cancer. Denies breast mass, skin changes, nipple discharge and pain. Breast history -   Referring - Dr. Lisa Euceda  No history of breast biopsies       Family history -   Father - prostate cancer  Sister - breast cancer at 40; reports BRCA negative; also had leukemia at 15 and was treated with radiation  Grandfather - colon cancer  Grandmother - lung cancer  2021 -  The patient's risk of breast cancer was calculated using the [address] [place] [address] Street. Her 10 year risk is 6.7% (compared with a population risk of 2%). Her lifetime risk is 36% (compared with a population risk of 12.6%). OB History          4    Para   4    Term   4            AB        Living   4         SAB        IAB        Ectopic        Molar        Multiple        Live Births              Obstetric Comments   Menarche 17, LMP , # of children 3, age of 4st delivery 36, Hysterectomy/oophorectomy No/No, Breast bx No, history of breast feeding Yes, BCP No, Hormone therapy Yes                   Past Surgical History:   Procedure Laterality Date     DELIVERY ONLY      DILATION AND CURETTAGE OF UTERUS      D&C x 3    GYN  2022    Endometrial Biopsy (TP)-benign    TONSILLECTOMY             Mammogram Result (most recent):  [facility] TARUN DIGITAL SCREEN BILATERAL 2023    Narrative  STUDY: Bilateral digital screening mammogram with 3-D tomosynthesis    INDICATION:  Screening. COMPARISON: , , ,     BREAST COMPOSITION: The breasts are heterogeneously dense, which may obscure  small masses. FINDINGS: Bilateral digital screening mammography was performed and is  interpreted in conjunction with a computer assisted detection (CAD) system.   Additionally, tomosynthesis of both breasts in the CC and MLO projections

## 2023-06-23 ENCOUNTER — NURSE ONLY (OUTPATIENT)
Age: 48
End: 2023-06-23

## 2023-06-23 DIAGNOSIS — Z30.42 ENCOUNTER FOR SURVEILLANCE OF INJECTABLE CONTRACEPTIVE: Primary | ICD-10-CM

## 2023-06-23 RX ORDER — MEDROXYPROGESTERONE ACETATE 150 MG/ML
150 INJECTION, SUSPENSION INTRAMUSCULAR
Status: SHIPPED | OUTPATIENT
Start: 2023-06-23

## 2023-06-23 RX ADMIN — MEDROXYPROGESTERONE ACETATE 150 MG: 150 INJECTION, SUSPENSION INTRAMUSCULAR at 11:18

## 2023-06-23 NOTE — PROGRESS NOTES
After obtaining consent, and per orders of Dr. Quin Ibrahim, injection of depo given in Left upper quad. gluteus by Lui Beltran CMA. Patient instructed to remain in clinic for 20 minutes afterwards, and to report any adverse reaction to me immediately. Last Depo-Provera: 3/31/23. Side Effects if any: none. Serum HCG indicated? no.  Next appointment due 9/8/23-9/22/23.

## 2023-09-12 ENCOUNTER — NURSE ONLY (OUTPATIENT)
Age: 48
End: 2023-09-12
Payer: COMMERCIAL

## 2023-09-12 VITALS
HEART RATE: 87 BPM | BODY MASS INDEX: 35.03 KG/M2 | SYSTOLIC BLOOD PRESSURE: 129 MMHG | WEIGHT: 218 LBS | DIASTOLIC BLOOD PRESSURE: 84 MMHG | HEIGHT: 66 IN

## 2023-09-12 DIAGNOSIS — Z30.42 ENCOUNTER FOR SURVEILLANCE OF INJECTABLE CONTRACEPTIVE: ICD-10-CM

## 2023-09-12 DIAGNOSIS — Z76.89 ENCOUNTER FOR MENSTRUAL REGULATION: Primary | ICD-10-CM

## 2023-09-12 PROCEDURE — 96372 THER/PROPH/DIAG INJ SC/IM: CPT | Performed by: OBSTETRICS & GYNECOLOGY

## 2023-09-12 RX ORDER — MEDROXYPROGESTERONE ACETATE 150 MG/ML
150 INJECTION, SUSPENSION INTRAMUSCULAR ONCE
Status: COMPLETED | OUTPATIENT
Start: 2023-09-12 | End: 2023-09-12

## 2023-09-12 RX ADMIN — MEDROXYPROGESTERONE ACETATE 150 MG: 150 INJECTION, SUSPENSION INTRAMUSCULAR at 10:25

## 2023-09-12 NOTE — PROGRESS NOTES
DEPO INJECTION NOTE  ~~~~~~~~~~~~~~~~~~~~~     Last depo injection: 6/23/23  Last ae: 1/13/23  Pregnancy test? N/A   Side effects? None/wt gain  Depo-Provera 150mg injection IM, given by Talia Arana in right glute. Next depo injection due: 11/28/23-12/12/23      Administered 150mg/mL Depo-Provera per orders of Dr. Mar Sharif. Verbal consent received by patient & injection given. Patient tolerated well, no complications and no side effects. Encouraged her to remain in clinic for 15 minutes and immediately report any adverse reactions. Patient informed of next injection date ranges and encouraged to schedule. She verbalized understanding and expressed intent to comply.

## 2023-11-28 ENCOUNTER — OFFICE VISIT (OUTPATIENT)
Age: 48
End: 2023-11-28
Payer: COMMERCIAL

## 2023-11-28 VITALS — WEIGHT: 229 LBS | DIASTOLIC BLOOD PRESSURE: 77 MMHG | BODY MASS INDEX: 36.96 KG/M2 | SYSTOLIC BLOOD PRESSURE: 119 MMHG

## 2023-11-28 DIAGNOSIS — Z30.42 ENCOUNTER FOR SURVEILLANCE OF INJECTABLE CONTRACEPTIVE: ICD-10-CM

## 2023-11-28 DIAGNOSIS — Z76.89 ENCOUNTER FOR MENSTRUAL REGULATION: Primary | ICD-10-CM

## 2023-11-28 PROCEDURE — 96372 THER/PROPH/DIAG INJ SC/IM: CPT | Performed by: OBSTETRICS & GYNECOLOGY

## 2023-11-28 RX ORDER — MEDROXYPROGESTERONE ACETATE 150 MG/ML
150 INJECTION, SUSPENSION INTRAMUSCULAR ONCE
Status: COMPLETED | OUTPATIENT
Start: 2023-11-28 | End: 2023-11-28

## 2023-11-28 RX ADMIN — MEDROXYPROGESTERONE ACETATE 150 MG: 150 INJECTION, SUSPENSION INTRAMUSCULAR at 15:14

## 2023-11-28 NOTE — PROGRESS NOTES
DEPO INJECTION NOTE  ~~~~~~~~~~~~~~~~~~~~~     Last depo injection: 09/12/2023  Last ae: 1/13/2024, scheduled for next ae/depo on 02/15/2024  Pregnancy test? N/A   Side effects? none  Depo-Provera 150mg injection IM, given by Jimmeander Media in left glute. Next depo injection due: 02/13/2024-02/27/2024      Administered 150mg/mL Depo-Provera per orders of Kg Conte MD. Verbal consent received by patient & injection given. Patient tolerated well, no complications and no side effects. Encouraged her to remain in clinic for 15 minutes and immediately report any adverse reactions. Patient informed of next injection date ranges and encouraged to schedule. She verbalized understanding and expressed intent to comply.

## 2024-02-05 RX ORDER — MEDROXYPROGESTERONE ACETATE 150 MG/ML
INJECTION, SUSPENSION INTRAMUSCULAR
Qty: 1 ML | Refills: 0 | Status: SHIPPED | OUTPATIENT
Start: 2024-02-05

## 2024-02-05 NOTE — TELEPHONE ENCOUNTER
48 year old patient last seen in the office on 1/13/2023 for ae    Patient has next ae and depo injection on 2/15/2024    Prescription refill sent as per MD verbal order to get patient to her scheduled appointment

## 2024-02-14 ENCOUNTER — TELEPHONE (OUTPATIENT)
Age: 49
End: 2024-02-14

## 2024-02-14 NOTE — TELEPHONE ENCOUNTER
Patient phoned verified .  Has appointment tomorrow for AE and Depo she tested positive today for covid.  Appointment rescheduled for 2024 at 8:20am per Julia for AE and Depo.  She will be one day late for Depo will do UPT at appointment.  Advised patient no unprotected sex.

## 2024-02-28 ENCOUNTER — OFFICE VISIT (OUTPATIENT)
Age: 49
End: 2024-02-28
Payer: COMMERCIAL

## 2024-02-28 VITALS
DIASTOLIC BLOOD PRESSURE: 81 MMHG | HEIGHT: 66 IN | HEART RATE: 96 BPM | BODY MASS INDEX: 36.87 KG/M2 | WEIGHT: 229.4 LBS | SYSTOLIC BLOOD PRESSURE: 135 MMHG

## 2024-02-28 DIAGNOSIS — L29.2 VULVAR ITCHING: ICD-10-CM

## 2024-02-28 DIAGNOSIS — Z76.89 ENCOUNTER FOR MENSTRUAL REGULATION: ICD-10-CM

## 2024-02-28 DIAGNOSIS — Z11.51 ENCOUNTER FOR SCREENING FOR HUMAN PAPILLOMAVIRUS (HPV): ICD-10-CM

## 2024-02-28 DIAGNOSIS — Z01.419 ENCOUNTER FOR GYNECOLOGICAL EXAMINATION: Primary | ICD-10-CM

## 2024-02-28 DIAGNOSIS — Z12.4 CERVICAL CANCER SCREENING: ICD-10-CM

## 2024-02-28 DIAGNOSIS — Z30.42 ENCOUNTER FOR SURVEILLANCE OF INJECTABLE CONTRACEPTIVE: ICD-10-CM

## 2024-02-28 DIAGNOSIS — N90.89 VULVAR IRRITATION: ICD-10-CM

## 2024-02-28 LAB
HCG, PREGNANCY, URINE, POC: NEGATIVE
VALID INTERNAL CONTROL, POC: YES

## 2024-02-28 PROCEDURE — 81025 URINE PREGNANCY TEST: CPT | Performed by: OBSTETRICS & GYNECOLOGY

## 2024-02-28 PROCEDURE — 96372 THER/PROPH/DIAG INJ SC/IM: CPT | Performed by: OBSTETRICS & GYNECOLOGY

## 2024-02-28 PROCEDURE — 99396 PREV VISIT EST AGE 40-64: CPT | Performed by: OBSTETRICS & GYNECOLOGY

## 2024-02-28 RX ORDER — MEDROXYPROGESTERONE ACETATE 150 MG/ML
150 INJECTION, SUSPENSION INTRAMUSCULAR ONCE
Status: COMPLETED | OUTPATIENT
Start: 2024-02-28 | End: 2024-02-28

## 2024-02-28 RX ORDER — MEDROXYPROGESTERONE ACETATE 150 MG/ML
INJECTION, SUSPENSION INTRAMUSCULAR
Qty: 1 ML | Refills: 4 | Status: SHIPPED | OUTPATIENT
Start: 2024-02-28

## 2024-02-28 RX ADMIN — MEDROXYPROGESTERONE ACETATE 150 MG: 150 INJECTION, SUSPENSION INTRAMUSCULAR at 09:28

## 2024-02-28 NOTE — PROGRESS NOTES
[facility]  [email or web address]  [phone]    Angelina Cerrato MD, FACOG        Annual Gynecologic Exam:  Chief Complaint   Patient presents with    Annual Exam     Depo Injection       Arlen Sifuentes is a ,  48 y.o. female   No LMP recorded. (Menstrual status: Chemically Induced).    She presents for her annual checkup.     She is having problems - vulvar irritation on and off, no new soaps or detergents.  She reports no new sexual partners.   No bleeding.  Occ twinges on left side sometimes related to constipation.      Per Rooming Note:    No LMP recorded. (Menstrual status: Chemically Induced).  Her periods are none  She does not have dysmenorrhea.  Problems: pt reports left side vulvar itching x a few months, intermittent & on, denies bumps. Pt wondering if she should have a fu for ov cyst she has had in the past, pt wondering where cyst was d/t having some LLQ \"twinges\", pt unsure if it is GI related.   Birth Control: depo injections.  Last Pap: 2021  She does not have a history of FAWN 2, 3 or cervical cancer.   Last Mammogram: 23 VBC  Last Bone Density: never obtained, mom scoliosis & bone loss, on orlin bone pill late after menopause. Pt taking 1200mg calcium & 1000UI vit d (in 70s)  Last colonoscopy: never obtained, pt plans on getting scheduled.     Sexual history and Contraception:    Social History     Substance and Sexual Activity   Sexual Activity Yes    Partners: Male    Birth control/protection: Injection      Past Medical History:   Diagnosis Date    H/O mammogram 2023    low risk/Heterogeneously dense - rec breast referral    History of mammography, screening 19,10/19/2018; 5/10/21; 22    neg; normal, dense; normal    Pap smear for cervical cancer screening 2017; 23    Negative, HPV negative; Neg pap and neg HPV    Thyroid activity decreased      Current Outpatient Medications   Medication Sig Dispense Refill

## 2024-02-28 NOTE — PROGRESS NOTES
DEPO INJECTION NOTE  ~~~~~~~~~~~~~~~~~~~~~     Last depo injection: 11/28/23  Last ae: 2/28/24 today  Pregnancy test? Negative today in office   Side effects? none  Depo-Provera 150mg injection IM, given by Lavern Portillo CMA in right glute.  Next depo injection due: 5/16/24-5/30/24      Administered 150mg/mL Depo-Provera per orders of Angelina Cerrato MD . Verbal consent received by patient & injection given. Patient tolerated well, no complications and no side effects. Encouraged her to remain in clinic for 15 minutes and immediately report any adverse reactions. Patient informed of next injection date ranges and encouraged to schedule. She verbalized understanding and expressed intent to comply.

## 2024-03-02 LAB
A VAGINAE DNA VAG QL NAA+PROBE: NORMAL SCORE
BVAB2 DNA VAG QL NAA+PROBE: NORMAL SCORE
C ALBICANS DNA VAG QL NAA+PROBE: NEGATIVE
C GLABRATA DNA VAG QL NAA+PROBE: NEGATIVE
MEGA1 DNA VAG QL NAA+PROBE: NORMAL SCORE
T VAGINALIS DNA VAG QL NAA+PROBE: NEGATIVE

## 2024-03-02 NOTE — RESULT ENCOUNTER NOTE
Normal vaginitis swab or with acceptable variants.  [employer] message sent, if MyChart active.

## 2024-03-07 LAB
CYTOLOGIST CVX/VAG CYTO: NORMAL
CYTOLOGY CVX/VAG DOC CYTO: NORMAL
CYTOLOGY CVX/VAG DOC THIN PREP: NORMAL
DX ICD CODE: NORMAL
HPV GENOTYPE REFLEX: NORMAL
HPV I/H RISK 4 DNA CVX QL PROBE+SIG AMP: NEGATIVE
Lab: NORMAL
Lab: NORMAL
OTHER STN SPEC: NORMAL
STAT OF ADQ CVX/VAG CYTO-IMP: NORMAL

## 2024-05-02 ENCOUNTER — HOSPITAL ENCOUNTER (OUTPATIENT)
Facility: HOSPITAL | Age: 49
Discharge: HOME | End: 2024-05-02
Payer: COMMERCIAL

## 2024-05-02 DIAGNOSIS — Z12.31 ENCOUNTER FOR SCREENING MAMMOGRAM FOR BREAST CANCER: ICD-10-CM

## 2024-05-02 PROCEDURE — 77063 BREAST TOMOSYNTHESIS BI: CPT

## 2024-05-02 NOTE — PROGRESS NOTES
HISTORY OF PRESENT ILLNESS  Arlen Sifuentes is a 48 y.o. female     HPI Established patient presents for follow-up as a high risk patient due to a family history of breast cancer. Denies breast mass, skin changes, nipple discharge and pain.             Breast history -   Referring - Dr. Angelina Cerrato  No history of breast biopsies        Family history -   Father - prostate cancer  Sister - breast cancer at 37; reports BRCA negative; also had leukemia at 14 and was treated with radiation  Grandfather - colon cancer  Grandmother - lung cancer  2021 -  The patient's risk of breast cancer was calculated using the [employer] model. Her 10 year risk is 6.7% (compared with a population risk of 2%). Her lifetime risk is 36% (compared with a population risk of 12.6%).        OB History          4    Para   3    Term   3            AB   1    Living   4         SAB   1    IAB        Ectopic        Molar        Multiple   1    Live Births   4          Obstetric Comments   Menarche 14, LMP , # of children 4, age of 1st delivery 31, Hysterectomy/oophorectomy No/No, Breast bx No, history of breast feeding Yes, BCP No, Hormone therapy Yes                   Past Surgical History:   Procedure Laterality Date     DELIVERY ONLY      DILATION AND CURETTAGE OF UTERUS      D&C x 3    GYN  2022    Endometrial Biopsy (TP)-benign    TONSILLECTOMY             Mammogram Result (most recent):  [facility] TARUN DIGITAL SCREEN BILATERAL 2023    Narrative  STUDY: Bilateral digital screening mammogram with 3-D tomosynthesis    INDICATION:  Screening.    COMPARISON: , , ,     BREAST COMPOSITION: The breasts are heterogeneously dense, which may obscure  small masses.    FINDINGS: Bilateral digital screening mammography was performed and is  interpreted in conjunction with a computer assisted detection (CAD) system.  Additionally, tomosynthesis of both breasts in the CC and MLO projections

## 2024-05-03 ENCOUNTER — OFFICE VISIT (OUTPATIENT)
Age: 49
End: 2024-05-03
Payer: COMMERCIAL

## 2024-05-03 VITALS — WEIGHT: 229 LBS | HEIGHT: 66 IN | BODY MASS INDEX: 36.8 KG/M2

## 2024-05-03 DIAGNOSIS — Z80.3 FAMILY HISTORY OF BREAST CANCER: ICD-10-CM

## 2024-05-03 DIAGNOSIS — N60.11 FIBROCYSTIC BREAST CHANGES OF BOTH BREASTS: ICD-10-CM

## 2024-05-03 DIAGNOSIS — R92.8 ABNORMAL FINDING ON BREAST IMAGING: Primary | ICD-10-CM

## 2024-05-03 DIAGNOSIS — N60.12 FIBROCYSTIC BREAST CHANGES OF BOTH BREASTS: ICD-10-CM

## 2024-05-03 PROCEDURE — 99213 OFFICE O/P EST LOW 20 MIN: CPT | Performed by: NURSE PRACTITIONER

## 2024-05-03 RX ORDER — LEVOTHYROXINE SODIUM 0.03 MG/1
1 TABLET ORAL EVERY MORNING
COMMUNITY
Start: 2024-04-16 | End: 2024-07-15

## 2024-05-13 ENCOUNTER — HOSPITAL ENCOUNTER (OUTPATIENT)
Facility: HOSPITAL | Age: 49
Discharge: HOME | End: 2024-05-16
Payer: COMMERCIAL

## 2024-05-13 DIAGNOSIS — R92.8 ABNORMAL FINDING ON BREAST IMAGING: ICD-10-CM

## 2024-05-13 PROCEDURE — G0279 TOMOSYNTHESIS, MAMMO: HCPCS

## 2024-05-13 PROCEDURE — 76642 ULTRASOUND BREAST LIMITED: CPT

## 2024-05-17 ENCOUNTER — NURSE ONLY (OUTPATIENT)
Age: 49
End: 2024-05-17
Payer: COMMERCIAL

## 2024-05-17 DIAGNOSIS — Z30.42 ENCOUNTER FOR DEPO-PROVERA CONTRACEPTION: Primary | ICD-10-CM

## 2024-05-17 PROCEDURE — 96372 THER/PROPH/DIAG INJ SC/IM: CPT | Performed by: OBSTETRICS & GYNECOLOGY

## 2024-05-17 RX ORDER — MEDROXYPROGESTERONE ACETATE 150 MG/ML
150 INJECTION, SUSPENSION INTRAMUSCULAR
Status: SHIPPED | OUTPATIENT
Start: 2024-05-17

## 2024-05-17 RX ADMIN — MEDROXYPROGESTERONE ACETATE 150 MG: 150 INJECTION, SUSPENSION INTRAMUSCULAR at 10:50

## 2024-05-17 NOTE — PROGRESS NOTES
Last AE 2/28/24  Last depo inj 2/28/24  Urine UPT needed no, within dates  Next depo inj due between 8/2/24-8/16/24    Pt tolerated injection well.  Encouraged her to call if any problems or concerns arrive.    After obtaining consent, and per orders of Dr Cerrato , injection of depo 150mg given in left glute by Echo Monk RN. Patient instructed to remain in clinic for 20 minutes afterwards, and to report any adverse reaction to me immediately. Lot: [id number] Exp: 5/31/27 NDC: 95682-899-85

## 2024-05-23 ENCOUNTER — TRANSCRIBE ORDERS (OUTPATIENT)
Facility: HOSPITAL | Age: 49
End: 2024-05-23

## 2024-05-23 ENCOUNTER — HOSPITAL ENCOUNTER (OUTPATIENT)
Facility: HOSPITAL | Age: 49
End: 2024-05-23
Payer: COMMERCIAL

## 2024-05-23 ENCOUNTER — HOSPITAL ENCOUNTER (OUTPATIENT)
Facility: HOSPITAL | Age: 49
Discharge: HOME | End: 2024-05-23
Payer: COMMERCIAL

## 2024-05-23 DIAGNOSIS — N63.22 MASS OF UPPER INNER QUADRANT OF LEFT BREAST: ICD-10-CM

## 2024-05-23 DIAGNOSIS — N60.02 BREAST CYST, LEFT: ICD-10-CM

## 2024-05-23 DIAGNOSIS — R92.8 ABNORMAL MAMMOGRAM OF LEFT BREAST: Primary | ICD-10-CM

## 2024-05-23 PROCEDURE — 76642 ULTRASOUND BREAST LIMITED: CPT

## 2024-05-24 ENCOUNTER — TRANSCRIBE ORDERS (OUTPATIENT)
Facility: HOSPITAL | Age: 49
End: 2024-05-24

## 2024-05-24 DIAGNOSIS — Z12.31 SCREENING MAMMOGRAM FOR HIGH-RISK PATIENT: Primary | ICD-10-CM

## 2024-08-12 ENCOUNTER — NURSE ONLY (OUTPATIENT)
Age: 49
End: 2024-08-12
Payer: COMMERCIAL

## 2024-08-12 VITALS — BODY MASS INDEX: 37.74 KG/M2 | WEIGHT: 233.8 LBS | SYSTOLIC BLOOD PRESSURE: 127 MMHG | DIASTOLIC BLOOD PRESSURE: 85 MMHG

## 2024-08-12 DIAGNOSIS — Z30.42 ENCOUNTER FOR DEPO-PROVERA CONTRACEPTION: Primary | ICD-10-CM

## 2024-08-12 PROCEDURE — 96372 THER/PROPH/DIAG INJ SC/IM: CPT | Performed by: OBSTETRICS & GYNECOLOGY

## 2024-08-12 RX ORDER — MEDROXYPROGESTERONE ACETATE 150 MG/ML
150 INJECTION, SUSPENSION INTRAMUSCULAR
Status: SHIPPED | OUTPATIENT
Start: 2024-08-12

## 2024-08-12 RX ADMIN — MEDROXYPROGESTERONE ACETATE 150 MG: 150 INJECTION, SUSPENSION INTRAMUSCULAR at 08:27

## 2024-08-12 NOTE — PROGRESS NOTES
DEPO INJECTION NOTE  ~~~~~~~~~~~~~~~~~~~~~     Last depo injection: 5/17/2024  Last ae: 2/28/2024  Pregnancy test? Not indicated   Side effects? Not indicated  Depo-Provera 150mg injection IM, given by Julia Lynn LPN right glute   Next depo injection due: Oct 28- Nov 11      Administered 150mg/mL Depo-Provera per orders of DIANE GODOY NURSE . Verbal consent received by patient & injection given. Patient tolerated well, no complications and no side effects. Encouraged her to remain in clinic for 15 minutes and immediately report any adverse reactions. Patient informed of next injection date ranges and encouraged to schedule. She verbalized understanding and expressed intent to comply.

## 2024-10-28 ENCOUNTER — NURSE ONLY (OUTPATIENT)
Age: 49
End: 2024-10-28

## 2024-10-28 VITALS
SYSTOLIC BLOOD PRESSURE: 92 MMHG | HEIGHT: 66 IN | DIASTOLIC BLOOD PRESSURE: 68 MMHG | HEART RATE: 101 BPM | WEIGHT: 232.6 LBS | BODY MASS INDEX: 37.38 KG/M2

## 2024-10-28 NOTE — PROGRESS NOTES
DEPO INJECTION NOTE  ~~~~~~~~~~~~~~~~~~~~~     Last depo injection: 08/12/24  Last ae: 2/28/24  Pregnancy test? N/A   Side effects? none  Depo-Provera 150mg injection IM, given by Lavern Portillo CMA in left glute.  Next depo injection due: 1/13/25-1/27/25      Administered 150mg/mL Depo-Provera per orders of Dr. Cerrato. Verbal consent received by patient & injection given. Patient tolerated well, no complications and no side effects. Encouraged her to remain in clinic for 15 minutes and immediately report any adverse reactions. Patient informed of next injection date ranges and encouraged to schedule. She verbalized understanding and expressed intent to comply.

## 2024-11-26 ENCOUNTER — HOSPITAL ENCOUNTER (OUTPATIENT)
Facility: HOSPITAL | Age: 49
Discharge: HOME | End: 2024-11-29
Payer: COMMERCIAL

## 2024-11-26 DIAGNOSIS — R92.8 ABNORMAL MAMMOGRAM OF LEFT BREAST: ICD-10-CM

## 2024-11-26 PROCEDURE — 76642 ULTRASOUND BREAST LIMITED: CPT

## 2025-01-15 ENCOUNTER — NURSE ONLY (OUTPATIENT)
Age: 50
End: 2025-01-15
Payer: COMMERCIAL

## 2025-01-15 DIAGNOSIS — Z30.42 ENCOUNTER FOR DEPO-PROVERA CONTRACEPTION: Primary | ICD-10-CM

## 2025-01-15 PROCEDURE — 96372 THER/PROPH/DIAG INJ SC/IM: CPT | Performed by: OBSTETRICS & GYNECOLOGY

## 2025-01-15 RX ADMIN — MEDROXYPROGESTERONE ACETATE 150 MG: 150 INJECTION, SUSPENSION INTRAMUSCULAR at 10:08

## 2025-01-15 NOTE — PROGRESS NOTES
After obtaining consent, and per orders of Dr. Cerrato, injection of Depo given in Right upper quad. gluteus by HAYLEE SABA CMA. Patient instructed to remain in clinic for 20 minutes afterwards, and to report any adverse reaction to me immediately.    Next injection due:  4/2/2025 - 4/16/2025

## 2025-03-03 ENCOUNTER — OFFICE VISIT (OUTPATIENT)
Age: 50
End: 2025-03-03
Payer: COMMERCIAL

## 2025-03-03 VITALS
SYSTOLIC BLOOD PRESSURE: 131 MMHG | WEIGHT: 234 LBS | DIASTOLIC BLOOD PRESSURE: 83 MMHG | BODY MASS INDEX: 37.77 KG/M2 | HEART RATE: 112 BPM

## 2025-03-03 DIAGNOSIS — Z30.42 ENCOUNTER FOR SURVEILLANCE OF INJECTABLE CONTRACEPTIVE: ICD-10-CM

## 2025-03-03 DIAGNOSIS — Z01.419 ENCOUNTER FOR GYNECOLOGICAL EXAMINATION: ICD-10-CM

## 2025-03-03 DIAGNOSIS — Z76.89 ENCOUNTER FOR MENSTRUAL REGULATION: ICD-10-CM

## 2025-03-03 DIAGNOSIS — N76.3 SUBACUTE VULVITIS: Primary | ICD-10-CM

## 2025-03-03 PROCEDURE — 99396 PREV VISIT EST AGE 40-64: CPT | Performed by: OBSTETRICS & GYNECOLOGY

## 2025-03-03 PROCEDURE — 99213 OFFICE O/P EST LOW 20 MIN: CPT | Performed by: OBSTETRICS & GYNECOLOGY

## 2025-03-03 PROCEDURE — 99459 PELVIC EXAMINATION: CPT | Performed by: OBSTETRICS & GYNECOLOGY

## 2025-03-03 RX ORDER — NYSTATIN AND TRIAMCINOLONE ACETONIDE 100000; 1 [USP'U]/G; MG/G
CREAM TOPICAL 2 TIMES DAILY
Qty: 15 G | Refills: 0 | Status: SHIPPED | OUTPATIENT
Start: 2025-03-03 | End: 2025-03-10

## 2025-03-03 RX ORDER — MEDROXYPROGESTERONE ACETATE 150 MG/ML
INJECTION, SUSPENSION INTRAMUSCULAR
Qty: 1 ML | Refills: 4 | Status: SHIPPED | OUTPATIENT
Start: 2025-03-03

## 2025-03-03 NOTE — PROGRESS NOTES
Arlen Sifuentes is a 49 y.o. female returns for an annual exam     Chief Complaint   Patient presents with    Annual Exam    Vaginal discomfort       No LMP recorded.  Her periods are absent  Problems: problems - reports an inflamed varicose vein that has been causing discomfort.   Birth Control: Depo-Provera injections.  Last Pap: see report obtained 1 year(s) ago.  She does not have a history of FAWN 2, 3 or cervical cancer.   Last Mammogram: 5/24/2024 - see report  Last colonoscopy: a few weeks ago per pt and was normal other than one benign polyp      1. Have you been to the ER, urgent care clinic, or hospitalized since your last visit? No    2. Have you seen or consulted any other health care providers outside of the [facility] since your last visit? No    Examination chaperoned by Julia Lynn LPN.  
  Allergen Reactions    Doxycycline Diarrhea     GI intolerance    Penicillins Rash       Patient Active Problem List   Diagnosis    Myoma       Review of Systems: History obtained from the patient  Constitutional: see HPI   Breast: no breast changes  GI: no bowel complaints  : see HPI   MSK: negative for back pain, joint pain, muscle pain  Skin: no skin change concerns  Psych: stable, no acute concerns    Objective:  /83   Pulse (!) 112   Wt 106.1 kg (234 lb)   BMI 37.77 kg/m²     PHYSICAL EXAMINATION:  See exam from [facility]      ASSESSMENT:  Encounter Diagnoses   Name Primary?    Encounter for gynecological examination     Encounter for menstrual regulation     Encounter for surveillance of injectable contraceptive     Subacute vulvitis Yes         PLAN:  The patient was instructed to follow up as needed if symptoms persist or worsen.  Instructions were given to patient and the patient was given the opportunity to ask any questions concerning the visit today.  The patient should keep/make a routine gynecologic exam.  The patient should contact our office with any questions or concerns.  Disc possible causes of dermatitis/discomfort.  Topical rx sent  Notify MD if NI, consider vulvar biopsy.   Disc good vulvar hygiene      Orders Placed This Encounter    medroxyPROGESTERone (DEPO-PROVERA) 150 MG/ML injection     Sig: INJECT 1 ML BY INTRAMUSCULAR ROUTE ONCE FOR 1 DOSE.     Dispense:  1 mL     Refill:  4    nystatin-triamcinolone (MYCOLOG II) 327053-2.1 UNIT/GM-% cream     Sig: Apply topically 2 times daily for 7 days     Dispense:  15 g     Refill:  0       No results found for this visit on 03/03/25.    Return in about 1 year (around 3/3/2026) for lemuel - leopoldo TEMPLE.    A pelvic exam and associated supplies were necessary for evaluation of the patient for this type of visit.        Provider chaperoned and assisted by: Lavern Portillo MA for pelvic exam and additional supplies used for pelvic exam.            
]  Bladder: non-tender to palpation  Urethra: appears normal  Cervix: normal, non tender   Uterus: normal, non tender  Adnexa: no adnexal tenderness present, no adnexal masses present  Perineum: normal appearing  Anus: normal appearing    Skin  General Inspection: no significant rash    Neurologic/Psychiatric  Mental Status:  Orientation: grossly oriented and alert  Mood and Affect: mood normal, affect appropriate    Assessment:  49 y.o.  for annual gynecologic exam.  Encounter Diagnoses   Name Primary?    Encounter for gynecological examination     Encounter for menstrual regulation     Encounter for surveillance of injectable contraceptive        Plan:  Recommend follow up one year for routine annual gynecologic exam or sooner as needed for any GYN concerns.  Patient should follow up with a primary care physician for chronic medical problems and evaluation of non-gynecologic concerns.  STD testing recommended per CDC guidelines and at patient request.   Follow up: annual gynecologic exam one year.  See separate note  Discussed calcium/vitamin D/weight bearing exercise and osteoporosis prevention: handout given at prior visit, pt reports does not need again  Continue depo provera  Disc perimenopausal and  symptoms and bleeding patterns        Folllow up:  [x] return for annual well woman exam in one year or sooner if the patient is having problems  [] follow up and ultrasound  [] 6 months  [] 3 months  [] 6 weeks   [] 1 month  [x] 1 year: WWE    No orders of the defined types were placed in this encounter.        A pelvic exam and associated supplies were necessary for evaluation of the patient for this type of visit.      Provider chaperoned and assisted by: Julia Lynn LPN for pelvic exam and additional supplies used for pelvic exam.

## 2025-04-10 ENCOUNTER — CLINICAL SUPPORT (OUTPATIENT)
Age: 50
End: 2025-04-10

## 2025-04-10 VITALS
SYSTOLIC BLOOD PRESSURE: 125 MMHG | WEIGHT: 239 LBS | HEART RATE: 101 BPM | HEIGHT: 66 IN | BODY MASS INDEX: 38.41 KG/M2 | DIASTOLIC BLOOD PRESSURE: 83 MMHG

## 2025-04-10 DIAGNOSIS — Z76.89 ENCOUNTER FOR MENSTRUAL REGULATION: ICD-10-CM

## 2025-04-10 DIAGNOSIS — Z30.42 ENCOUNTER FOR MANAGEMENT AND INJECTION OF DEPO-PROVERA: Primary | ICD-10-CM

## 2025-04-10 NOTE — PROGRESS NOTES
DEPO INJECTION NOTE  ~~~~~~~~~~~~~~~~~~~~~     Last depo injection: 01/15/2025  Last ae: 03/03/2025  Pregnancy test? N/A  Side effects? NONE  Depo-Provera 150mg injection IM, given by Lavern Portillo CMA in LEFT GLUTE.  Next depo injection due: 06/26/2025-07/10/2025      Administered 150mg/mL Depo-Provera per orders of DIANE GODOY NURSE . Verbal consent received by patient & injection given. Patient tolerated well, no complications and no side effects. Encouraged her to remain in clinic for 15 minutes and immediately report any adverse reactions. Patient informed of next injection date ranges and encouraged to schedule. She verbalized understanding and expressed intent to comply.

## 2025-05-23 ENCOUNTER — TRANSCRIBE ORDERS (OUTPATIENT)
Facility: HOSPITAL | Age: 50
End: 2025-05-23

## 2025-05-23 DIAGNOSIS — Z12.39 SCREENING BREAST EXAMINATION: Primary | ICD-10-CM

## 2025-05-23 DIAGNOSIS — Z12.31 OTHER SCREENING MAMMOGRAM: Primary | ICD-10-CM

## 2025-06-06 ENCOUNTER — HOSPITAL ENCOUNTER (OUTPATIENT)
Facility: HOSPITAL | Age: 50
Discharge: HOME | End: 2025-06-09
Payer: COMMERCIAL

## 2025-06-06 VITALS — WEIGHT: 239 LBS | BODY MASS INDEX: 38.58 KG/M2

## 2025-06-06 DIAGNOSIS — Z12.31 VISIT FOR SCREENING MAMMOGRAM: ICD-10-CM

## 2025-06-06 PROCEDURE — 77063 BREAST TOMOSYNTHESIS BI: CPT

## 2025-06-13 ENCOUNTER — OFFICE VISIT (OUTPATIENT)
Age: 50
End: 2025-06-13
Payer: COMMERCIAL

## 2025-06-13 VITALS
DIASTOLIC BLOOD PRESSURE: 87 MMHG | TEMPERATURE: 99.2 F | HEART RATE: 68 BPM | WEIGHT: 239 LBS | RESPIRATION RATE: 18 BRPM | SYSTOLIC BLOOD PRESSURE: 129 MMHG | HEIGHT: 66 IN | BODY MASS INDEX: 38.41 KG/M2

## 2025-06-13 DIAGNOSIS — Z80.3 FAMILY HISTORY OF BREAST CANCER: ICD-10-CM

## 2025-06-13 DIAGNOSIS — N60.12 FIBROCYSTIC BREAST CHANGES OF BOTH BREASTS: Primary | ICD-10-CM

## 2025-06-13 DIAGNOSIS — N60.11 FIBROCYSTIC BREAST CHANGES OF BOTH BREASTS: Primary | ICD-10-CM

## 2025-06-13 PROCEDURE — 99213 OFFICE O/P EST LOW 20 MIN: CPT | Performed by: NURSE PRACTITIONER

## 2025-06-13 RX ORDER — LEVOTHYROXINE SODIUM 50 UG/1
TABLET ORAL
COMMUNITY
Start: 2025-04-28

## 2025-06-13 NOTE — PROGRESS NOTES
routine follow-up or sooner PRN. She is comfortable with this plan.  All questions answered and she stated understanding.          Total time spent for this patient - 20 minutes.

## 2025-06-26 ENCOUNTER — CLINICAL SUPPORT (OUTPATIENT)
Age: 50
End: 2025-06-26
Payer: COMMERCIAL

## 2025-06-26 DIAGNOSIS — Z30.42 ENCOUNTER FOR MANAGEMENT AND INJECTION OF DEPO-PROVERA: Primary | ICD-10-CM

## 2025-06-26 PROCEDURE — 96372 THER/PROPH/DIAG INJ SC/IM: CPT | Performed by: OBSTETRICS & GYNECOLOGY

## 2025-06-26 RX ADMIN — MEDROXYPROGESTERONE ACETATE 150 MG: 150 INJECTION, SUSPENSION INTRAMUSCULAR at 13:11

## 2025-06-26 NOTE — PROGRESS NOTES
After obtaining consent, and per orders of Dr. Chapa, injection of Depo given in Right upper quad. gluteus by HAYLEE SABA CMA. Patient instructed to remain in clinic for 20 minutes afterwards, and to report any adverse reaction to me immediately.     Next injection due:  9/11/2025 through 9/25/2025